# Patient Record
Sex: FEMALE | Race: WHITE | NOT HISPANIC OR LATINO | Employment: FULL TIME | URBAN - METROPOLITAN AREA
[De-identification: names, ages, dates, MRNs, and addresses within clinical notes are randomized per-mention and may not be internally consistent; named-entity substitution may affect disease eponyms.]

---

## 2018-02-02 ENCOUNTER — HISTORICAL (OUTPATIENT)
Dept: RADIOLOGY | Facility: HOSPITAL | Age: 63
End: 2018-02-02

## 2019-10-31 ENCOUNTER — HISTORICAL (OUTPATIENT)
Dept: RADIOLOGY | Facility: HOSPITAL | Age: 64
End: 2019-10-31

## 2021-08-09 LAB — CRC RECOMMENDATION EXT: NORMAL

## 2021-10-26 ENCOUNTER — HISTORICAL (OUTPATIENT)
Dept: ENDOSCOPY | Facility: HOSPITAL | Age: 66
End: 2021-10-26

## 2021-10-26 LAB
ABS NEUT (OLG): 4.55 X10(3)/MCL (ref 2.1–9.2)
BASOPHILS # BLD AUTO: 0 X10(3)/MCL (ref 0–0.2)
BASOPHILS NFR BLD AUTO: 0 %
EOSINOPHIL # BLD AUTO: 0.2 X10(3)/MCL (ref 0–0.9)
EOSINOPHIL NFR BLD AUTO: 2 %
ERYTHROCYTE [DISTWIDTH] IN BLOOD BY AUTOMATED COUNT: 17.2 % (ref 11.5–17)
HCT VFR BLD AUTO: 36.6 % (ref 37–47)
HGB BLD-MCNC: 11.1 GM/DL (ref 12–16)
LYMPHOCYTES # BLD AUTO: 1.5 X10(3)/MCL (ref 0.6–4.6)
LYMPHOCYTES NFR BLD AUTO: 22 %
MCH RBC QN AUTO: 26.8 PG (ref 27–31)
MCHC RBC AUTO-ENTMCNC: 30.3 GM/DL (ref 33–36)
MCV RBC AUTO: 88.4 FL (ref 80–94)
MONOCYTES # BLD AUTO: 0.6 X10(3)/MCL (ref 0.1–1.3)
MONOCYTES NFR BLD AUTO: 8 %
NEUTROPHILS # BLD AUTO: 4.55 X10(3)/MCL (ref 2.1–9.2)
NEUTROPHILS NFR BLD AUTO: 66 %
PLATELET # BLD AUTO: 277 X10(3)/MCL (ref 130–400)
PMV BLD AUTO: 10.7 FL (ref 9.4–12.4)
RBC # BLD AUTO: 4.14 X10(6)/MCL (ref 4.2–5.4)
WBC # SPEC AUTO: 6.9 X10(3)/MCL (ref 4.5–11.5)

## 2022-01-27 ENCOUNTER — HISTORICAL (OUTPATIENT)
Dept: ADMINISTRATIVE | Facility: HOSPITAL | Age: 67
End: 2022-01-27

## 2022-01-27 LAB
ALBUMIN SERPL-MCNC: 3.5 G/DL (ref 3.4–4.8)
ALBUMIN/GLOB SERPL: 1.3 {RATIO} (ref 1.1–2)
ALP SERPL-CCNC: 110 U/L (ref 40–150)
ALT SERPL-CCNC: 15 U/L (ref 0–55)
AST SERPL-CCNC: 14 U/L (ref 5–34)
BILIRUB SERPL-MCNC: 0.7 MG/DL
BILIRUBIN DIRECT+TOT PNL SERPL-MCNC: 0.3 (ref 0–0.5)
BILIRUBIN DIRECT+TOT PNL SERPL-MCNC: 0.4 (ref 0–0.8)
BUN SERPL-MCNC: 17.6 MG/DL (ref 9.8–20.1)
CALCIUM SERPL-MCNC: 10.5 MG/DL (ref 8.7–10.5)
CHLORIDE SERPL-SCNC: 107 MMOL/L (ref 98–107)
CO2 SERPL-SCNC: 26 MMOL/L (ref 23–31)
CREAT SERPL-MCNC: 0.56 MG/DL (ref 0.55–1.02)
ERYTHROCYTE [DISTWIDTH] IN BLOOD BY AUTOMATED COUNT: 15.4 % (ref 11.5–17)
GLOBULIN SER-MCNC: 2.7 G/DL (ref 2.4–3.5)
GLUCOSE SERPL-MCNC: 107 MG/DL (ref 82–115)
HCT VFR BLD AUTO: 35.5 % (ref 37–47)
HEMOLYSIS INTERF INDEX SERPL-ACNC: <0
HGB BLD-MCNC: 13.2 G/DL (ref 12–16)
ICTERIC INTERF INDEX SERPL-ACNC: 1
LIPEMIC INTERF INDEX SERPL-ACNC: 0
MCH RBC QN AUTO: 35.5 PG (ref 27–31)
MCHC RBC AUTO-ENTMCNC: 37.2 G/DL (ref 33–36)
MCV RBC AUTO: 95.4 FL (ref 80–94)
PLATELET # BLD AUTO: 264 10*3/UL (ref 130–400)
PMV BLD AUTO: 10.7 FL (ref 9.4–12.4)
POTASSIUM SERPL-SCNC: 4.3 MMOL/L (ref 3.5–5.1)
PROT SERPL-MCNC: 6.2 G/DL (ref 5.8–7.6)
RBC # BLD AUTO: 3.72 10*6/UL (ref 4.2–5.4)
SODIUM SERPL-SCNC: 143 MMOL/L (ref 136–145)
WBC # SPEC AUTO: 6 10*3/UL (ref 4.5–11.5)

## 2022-02-01 ENCOUNTER — HISTORICAL (OUTPATIENT)
Dept: ADMINISTRATIVE | Facility: HOSPITAL | Age: 67
End: 2022-02-01

## 2022-02-02 LAB — SARS-COV-2 RNA RESP QL NAA+PROBE: NOT DETECTED

## 2022-02-11 ENCOUNTER — HISTORICAL (OUTPATIENT)
Dept: ADMINISTRATIVE | Facility: HOSPITAL | Age: 67
End: 2022-02-11

## 2022-04-11 ENCOUNTER — HISTORICAL (OUTPATIENT)
Dept: ADMINISTRATIVE | Facility: HOSPITAL | Age: 67
End: 2022-04-11
Payer: COMMERCIAL

## 2022-04-19 ENCOUNTER — HISTORICAL (OUTPATIENT)
Dept: RADIOLOGY | Facility: HOSPITAL | Age: 67
End: 2022-04-19
Payer: COMMERCIAL

## 2022-04-19 ENCOUNTER — HISTORICAL (OUTPATIENT)
Dept: ADMINISTRATIVE | Facility: HOSPITAL | Age: 67
End: 2022-04-19
Payer: COMMERCIAL

## 2022-04-29 VITALS
BODY MASS INDEX: 39.72 KG/M2 | HEIGHT: 63 IN | SYSTOLIC BLOOD PRESSURE: 160 MMHG | OXYGEN SATURATION: 97 % | DIASTOLIC BLOOD PRESSURE: 68 MMHG | WEIGHT: 224.19 LBS

## 2022-04-30 NOTE — H&P
Patient:   Adenike Sauceda             MRN: [id number]            FIN: [id number]               Age:   66 years     Sex:  Female     :  1955   Associated Diagnoses:   None   Author:   Vishal Fine MD A      Chief Complaint   66-year-old female referred for iron deficiency anemia.  She had upper endoscopy that showed a significant pyloric stenosis unable to transverse with the standard scope.  We did not have the appropriate balloons to dilator so we recommended twice daily PPI and Carafate with plans to dilate at the hospital 8 weeks later.  She had a reassuring colonoscopy at this so we thought the anemia was related to this.  She has been maintained on iron replacement we have since not checked her CBC yet.  Plan for EGD today with dilation hopefully evaluate the stricture and consider some form of imaging for not satisfied with our exam.  She says she has less heartburn.  She quit taking the meloxicam has increased arthritic symptoms but otherwise been doing well she is tolerating her diet with no complaints      Health Status   Allergies:    Allergic Reactions (Selected)  No Known Medication Allergies,    Allergies (1) Active Reaction  No Known Medication Allergies None Documented     Current medications:  (Selected)   Inpatient Medications  Ordered  Buffered Lidocaine 1% - 1mL syringe: 0.5 mL, 5 mg =, form: Injection, ID, As Directed PRN for other (see comment), first dose 10/26/21 7:08:00 CDT, May inject 0.5mL at IV site, if not allergic  Plasmalyte 1,000 mL: 1,000 mL, 1,000 mL, IV, 50 mL/hr, start date 10/26/21 7:08:00 CDT, 1.95, m2  midazolam: 2 mg, form: Injection, IV Push, q5min, Order duration: 2 dose(s), first dose 10/26/21 7:08:00 CDT, stop date 10/26/21 7:17:00 CDT, STAT, (up to 5 mg for moderate anxiety)  Documented Medications  Documented  Cod Liver Oil: 1 cap(s), Oral, Daily, 0 Refill(s)  DULoxetine 60 mg oral delayed release capsule: 60 mg = 1 cap(s), Oral, Daily  FLUTICASONE  SPR  50MC spray(s), Nasal, Daily  LOSARTAN POT TAB 50M mg = 1 tab(s), Oral, Daily  MELOXICAM    TAB 15MG: 15 mg = 1 tab(s), Oral, Daily  Pantoprazole 40 mg ORAL EC-Tablet: 40 mg = 1 tab(s), Oral, Daily  TRANDOLAPRIL 4 MG TABLET:   VERAPAMIL    TAB 240MG ER: 240 mg = 1 tab(s), Oral, Daily  Vitamin B Complex oral capsule: 1 cap(s), Oral, Daily, # 30 cap(s), 0 Refill(s)  Vitamin D3 2000 intl units oral capsule: 2,000 IntUnit = 1 cap(s), Oral, Daily, 0 Refill(s)  sucralfate 1 g oral tablet: 1 gm = 1 tab(s), Oral, BID, # 60 tab(s), 0 Refill(s)   Problem list:    All Problems  Morbid obesity / SNOMED CT 995660756 / Probable  Multinodular goiter / SNOMED CT 683472601 / Confirmed,    Active Problems (2)  Morbid obesity   Multinodular goiter         Histories   Past Medical History:    Resolved  Pregnant (021846420): Onset on 1986 at 30 years.  Resolved on 10/22/1986 at 31 years.  Pregnant ([id number]): Onset on 1977 at 21 years.  Resolved on 1978 at 22 years.  Pregnant ([id number]): Onset on 1973 at 17 years.  Resolved on 1973 at 18 years.  Hypertension (51446962):  Resolved.  Hepatitis C (92647848):  Resolved.  Thyroid nodule (407195896):  Resolved.   Family History:    Metastatic cancer  Brother  Congestive heart disease.  Father     Procedure history:    Nephrolithotomy for removal of calculus ([id number]) on 1981 at 25 Years.   Social History        Social & Psychosocial Habits    Alcohol  2018  Use: Current    Type: Beer, Liquor, Wine    Frequency: 1-2 times per month    2019  Use: Current    Type: Beer    Frequency: 1-2 times per week    Has alcohol use interfered with work or home life? No    Has anyone been hurt or at risk by your drinking? No    Concerns about alcohol use in household: No    Employment/School  2018  Status: Employed    Home/Environment  2018  Lives with: Spouse    Living situation: Home/Independent    Nutrition/Health  2018  Type of diet:  Regular    Substance Use  08/08/2018  Use: Never    11/13/2019  Use: Never    Tobacco  08/08/2018  Use: Never smoker    11/01/2018  Use: Never smoker    Type: Cigarettes    Patient Wants Consult For Cessation Counseling N/A    11/13/2019  Use: Never (less than 100 in l    Patient Wants Consult For Cessation Counseling N/A    10/26/2021  Use: Never (less than 100 in l    Patient Wants Consult For Cessation Counseling N/A    Abuse/Neglect  11/13/2019  SHX Any signs of abuse or neglect No    Feels unsafe at home: No    Safe place to go: Yes    10/26/2021  SHX Any signs of abuse or neglect No  .        Physical Examination      Vital Signs (last 24 hrs)_____  Last Charted___________  Resp Rate         12 br/min  (OCT 26 07:19)  SBP      H 152mmHg  (OCT 26 07:19)  DBP      73 mmHg  (OCT 26 07:19)  SpO2      98 %  (OCT 26 07:19)  Weight      97.52 kg  (OCT 26 07:16)  Height      162.56 cm  (OCT 26 07:16)  BMI      36.9  (OCT 26 07:16)     Measurements from flowsheet : Measurements   10/26/2021 7:16 CDT      Weight Dosing             97.52 kg                             Weight Measured           97.52 kg                             Weight Measured and Calculated in Lbs     214.99 lb                             Height/Length Dosing      162.56 cm                             Height/Length Measured    162.56 cm                             Body Mass Index Measured  36.9 kg/m2        Health Maintenance      Health Maintenance     Pending (in the next year)        OverDue           Bone Density Screening due  08/08/20  and every 2  year(s)           Depression Screening due  11/12/20  and every 1  year(s)           Alcohol Misuse Screening due  01/02/21  and every 1  year(s)           Cognitive Screening due  01/02/21  and every 1  year(s)           Fall Risk Assessment due  01/02/21  and every 1  year(s)           Blood Pressure Screening due  07/20/21  and every 1  year(s)           Body Mass Index Check due  07/20/21  and  every 1  year(s)        Due            ADL Screening due  10/26/21  and every 1  year(s)           Aspirin Therapy for CVD Prevention due  10/26/21  and every 1  year(s)           Breast Cancer Screening due  10/26/21  Unknown Frequency           Colorectal Screening due  10/26/21  Unknown Frequency           Diabetes Screening due  10/26/21  Unknown Frequency           Lipid Screening due  10/26/21  Unknown Frequency           Medicare Annual Wellness Exam due  10/26/21  and every 1  year(s)           Pneumococcal Vaccine due  10/26/21  Unknown Frequency           Tetanus Vaccine due  10/26/21  and every 10  year(s)           Zoster Vaccine due  10/26/21  Unknown Frequency        Due In Future            Obesity Screening not due until  01/01/22  and every 1  year(s)           Advance Directive not due until  01/02/22  and every 1  year(s)           Functional Assessment not due until  01/02/22  and every 1  year(s)     Satisfied (in the past 1 year)        Satisfied            Advance Directive on  10/26/21.  Satisfied by Lashawn Lester           Blood Pressure Screening on  10/26/21.  Satisfied by Lashawn Lester           Body Mass Index Check on  10/26/21.  Satisfied by Lashawn Lester           Functional Assessment on  10/26/21.  Satisfied by Lashawn Lester           Influenza Vaccine on  10/26/21.  Satisfied by Lashawn Lester           Obesity Screening on  10/26/21.  Satisfied by Lashawn Lester          Review / Management   Results review:     No qualifying data available.

## 2022-05-12 DIAGNOSIS — E04.2 MULTIPLE THYROID NODULES: Primary | ICD-10-CM

## 2022-10-18 ENCOUNTER — DOCUMENTATION ONLY (OUTPATIENT)
Dept: SURGICAL ONCOLOGY | Facility: CLINIC | Age: 67
End: 2022-10-18
Payer: MEDICARE

## 2022-10-18 NOTE — PROGRESS NOTES
1-12-22  DR. YUDY HDEZ OFFICE NOTE      EVAL FOR GALLSTONES AND PYLORIC STENOSIS    History of Present Illness         66 year-old-female referred by Dr. Vishal Fine who saw patient in consultation for anemia.  EGD was done showing a significant pyloric stenosis unable to transverse with scope and unable to dilate due to not having appropriate balloon.  Follow up EGD was performed in Oct 2021 showing pyloric stenosis secondary to PUD, dilation performed with biopsy.  Pathology showed acute gastritis with ulceration and fibrinopurulent exudate.  CT abdomen showed a large intraluminal gallstone measuring 4.2cm in the gallbladder neck.  EGD again performed in Dec 2021 showing persistent chronic ulceration with pyloric stenosis, still fairly tight, dilation performed.  Biopsies have not proven malignancy.  CT scan shows no obvious evidence of malignancy.  The patient does report significant 30 pound unintentional weight loss.    Review of Systems         14 point review of systems was performed and was negative except for those pertinent positives and negatives mentioned in the history of present illness    Physical Exam             General: Alert and oriented, No acute distress.  Eye: Pupils are equal, round and reactive to light, Extraocular movements are intact, Normal conjunctiva, Vision unchanged.  HENT: Normal hearing, Oral mucosa is moist, No pharyngeal erythema, Ear canals patent, No sinus tenderness.  Neck: Supple, Non-tender, No carotid bruit, No jugular venous distention, No lymphadenopathy, No thyromegaly.  Respiratory: Lungs are clear to auscultation, Respirations are non-labored, Breath sounds are equal, Symmetrical chest wall expansion, No chest wall tenderness.  Cardiovascular: Normal rate, Regular rhythm, No murmur, No gallop, Good pulses equal in all extremities, Normal peripheral perfusion, No edema.  Genitourinary: No costovertebral angle tenderness, No inguinal tenderness, No urethral  discharge, No lesions.  Lymphatics: No lymphadenopathy neck, axilla, groin.  Musculoskeletal: Normal range of motion, Normal strength, No tenderness, No swelling, No deformity, Normal gait.  Integumentary: Warm, Pink, Intact, Moist, No pallor, No rash.  Cognition and Speech: Oriented, Speech clear and coherent, Functional cognition intact.   Abdomen: Soft nontender, nondistended, no palpable masses    Assessment/Plan           1. Cholelithiasis K80.20       Ordered:           2. Pyloric stenosis in adult K31.1       Chronic pyloric/gastric ulcer with significant stenosis, cannot rule out malignancy.  Clinical picture is not consistent with gastrinoma.   Recommend laparoscopic/robotic antrectomy and vagotomy with Billroth II reconstruction, cholecystectomy  The risks and benefits of this procedure were explained in detail, all questions were answered, the patient voiced understanding, and gives their consent to proceed.          Ordered:      Office/Outpatient Visit Level 5 New [id number] PC, Cholelithiasis  Pyloric stenosis in adult, [facility], 01/12/22 14:42:00 CST              Problem List/Past Medical History     Ongoing   Morbid obesity    Multinodular goiter    Historical   Hepatitis C    Hypertension    Pregnant      Procedure/Surgical History   Balloon Dilation Gastrointestional (10/26/2021)  Biopsy Gastrointestinal (10/26/2021)  Dilation of Esophagus, Via Natural or Artificial Opening Endoscopic (10/26/2021)  Esophagogastroduodenoscopy (10/26/2021)  Esophagogastroduodenoscopy, flexible, transoral; with biopsy, single or multiple (10/26/2021)  Esophagogastroduodenoscopy, flexible, transoral; with transendoscopic balloon dilation of esophagus (less than 30 mm diameter) (10/26/2021)  Excision of Stomach, Pylorus, Via Natural or Artificial Opening Endoscopic (10/26/2021)  Nephrolithotomy for removal of calculus (01/01/1981)    Medications     Cod Liver Oil, 1 cap(s), Oral, Daily    DULoxetine 60 mg  oral delayed release capsule, 60 mg= 1 cap(s), Oral, Daily    FLUTICASONE SPR 50MCG, 2 spray(s), Nasal, Daily    LOSARTAN POT TAB 50MG, 50 mg= 1 tab(s), Oral, Daily    MELOXICAM TAB 15MG, 15 mg= 1 tab(s), Oral, Daily    Pantoprazole 40 mg ORAL EC-Tablet, 40 mg= 1 tab(s), Oral, Daily    sucralfate 1 g oral tablet, 1 gm= 1 tab(s), Oral, BID    TRANDOLAPRIL 4 MG TABLET    VERAPAMIL TAB 240MG ER, 240 mg= 1 tab(s), Oral, Daily    Vitamin B Complex oral capsule, 1 cap(s), Oral, Daily    Vitamin D3 2000 intl units oral capsule, 2000 IntUnit= 1 cap(s), Oral, Daily    Allergies     No Known Medication Allergies    Social History       Abuse/Neglect      No, 10/26/2021      No, No, Yes, 11/13/2019      Alcohol      Current, Beer, 1-2 times per week, Alcohol use interferes with work or home: No. Others hurt by drinking: No. Household alcohol concerns: No., 11/13/2019      Current, Beer, Wine, Liquor, 1-2 times per month, 08/08/2018      Employment/School      Employed, 08/08/2018      Home/Environment      Lives with Spouse. Living situation: Home/Independent., 08/08/2018      Nutrition/Health      Regular, 08/08/2018      Substance Use      Never, 11/13/2019      Never, 08/08/2018      Tobacco      Never (less than 100 in lifetime), N/A, 10/26/2021      Never (less than 100 in lifetime), N/A, 11/13/2019      Never smoker, Cigarettes, N/A, 11/01/2018      Never smoker Use:., 08/08/2018    Family History     Congestive heart disease.: Father.    Metastatic cancer: Brother.                         Result type:  Surgery Office/Clinic Note     Result date:  January 12, 2022 14:30 CST     Result status:  Auth (Verified)     Result title:  Office Visit Note     Performed by:  Funmilayo Gao on January 10, 2022 12:32 CST     Verified by:  Thanh Nassar MD on January 12, 2022 14:44 CST     Encounter info:  [id number], [facility], Clinic Visit, 1/12/2022 - 1/12/2022

## 2022-10-18 NOTE — PROGRESS NOTES
3-8-22   ANGIE PECK NP POST OP NOTE      Subjective POST OP LAP/RADHA ANTRECTOMY WITH B2, TREMAINE    HX PYLORIC STENOSIS SECONDARY TO PUD   Review of Systems PT REPORTS DOING WELL  SHE IS EATING SOFT FOODS AND TOLERATING WELL  BOWELS WORKING  NO NAUSEA OR VOMITING  NO FEVER OR CHILLS  NO COMPLAINTS OF PAIN Objective   Physical Exam General: Alert and oriented, No acute distress.    Integumentary: warm, dry, no rash or jaundice    Abd: soft, nontender    Incisions: healing well; no signs of infection  Assessment/Plan PATH: CHRONIC INFLAMMATION, BENIGN, GB GALLSTONES  PATH DISCUSSED AND QUESTIONS ANSWERED  PT PLEASED  WILL CALL WITH ANY PROBLEMS    RTC PRN   Result type: Postoperative Note   Result date: March 08, 2022 10:20 CST   Result status: Auth (Verified)   Result title: POST OP   Performed by: Angie Keith on March 08, 2022 10:20 CST   Verified by: Angie Keith on March 08, 2022 10:20 CST   Encounter info: [id number], [facility], Clinic Visit, 3/8/2022 - 3/8/2022

## 2022-10-25 ENCOUNTER — OFFICE VISIT (OUTPATIENT)
Dept: SURGICAL ONCOLOGY | Facility: CLINIC | Age: 67
End: 2022-10-25
Payer: MEDICARE

## 2022-10-25 VITALS
SYSTOLIC BLOOD PRESSURE: 159 MMHG | HEART RATE: 86 BPM | HEIGHT: 63 IN | WEIGHT: 215.19 LBS | BODY MASS INDEX: 38.13 KG/M2 | DIASTOLIC BLOOD PRESSURE: 82 MMHG

## 2022-10-25 DIAGNOSIS — K43.2 INCISIONAL HERNIA, WITHOUT OBSTRUCTION OR GANGRENE: Primary | ICD-10-CM

## 2022-10-25 PROCEDURE — 99213 OFFICE O/P EST LOW 20 MIN: CPT | Mod: S$PBB,,, | Performed by: SURGERY

## 2022-10-25 PROCEDURE — 99213 OFFICE O/P EST LOW 20 MIN: CPT | Mod: PBBFAC | Performed by: SURGERY

## 2022-10-25 PROCEDURE — 99213 PR OFFICE/OUTPT VISIT, EST, LEVL III, 20-29 MIN: ICD-10-PCS | Mod: S$PBB,,, | Performed by: SURGERY

## 2022-10-25 PROCEDURE — 99999 PR PBB SHADOW E&M-EST. PATIENT-LVL III: CPT | Mod: PBBFAC,,, | Performed by: SURGERY

## 2022-10-25 PROCEDURE — 99999 PR PBB SHADOW E&M-EST. PATIENT-LVL III: ICD-10-PCS | Mod: PBBFAC,,, | Performed by: SURGERY

## 2022-10-25 RX ORDER — IRBESARTAN 150 MG/1
150 TABLET ORAL DAILY
COMMUNITY
Start: 2022-01-12

## 2022-10-25 RX ORDER — AMLODIPINE BESYLATE 2.5 MG/1
2.5 TABLET ORAL DAILY
COMMUNITY
Start: 2022-01-12

## 2022-10-25 RX ORDER — FAMOTIDINE 20 MG/1
20 TABLET, FILM COATED ORAL DAILY PRN
COMMUNITY
Start: 2022-01-12

## 2022-10-25 RX ORDER — MELOXICAM 7.5 MG/1
7.5 TABLET ORAL DAILY
COMMUNITY

## 2022-10-25 NOTE — PROGRESS NOTES
Chief complaint:  Incisional hernia     HPI:  67-year-old female known to us for previous laparoscopic/robotic antrectomy with Billroth II reconstruction for benign intrinsic acquired pyloric stenosis secondary to peptic ulcer disease.  Her results from surgery were very satisfactory, now tolerating regular diet without difficulty no dumping syndrome, early satiety the significant gastrointestinal symptoms.  She does complain of a bulge at her left upper quadrant extraction site which has increased in size.  No obstructive symptoms    Greater than 30 minutes was required for complete chart review, patient consultation, medical decision-making, surgical scheduling/coordination and documentation          Past Medical and Surgical History  Allergies :   Patient has no known allergies.    @[facility]@  Medical :   She has a past medical history of History of hepatitis C and Hypertension.    Surgical :   She has a past surgical history that includes Pyloric antrectomy; L/R Truncal Vagotomy; Cholecystectomy; and Removal of Kidney Stone.     Family History  Her family history is not on file.    Social History  She      Review of Systems   Constitutional:  Negative for appetite change, chills, diaphoresis and fever.   HENT:  Negative for congestion, drooling, ear discharge, ear pain and hearing loss.    Eyes:  Negative for discharge.   Respiratory:  Negative for apnea, cough, choking, chest tightness, shortness of breath and stridor.    Cardiovascular:  Negative for chest pain, palpitations and leg swelling.   Endocrine: Negative for cold intolerance and heat intolerance.   Genitourinary:  Negative for difficulty urinating, dyspareunia, dysuria and hematuria.   Musculoskeletal:  Negative for arthralgias, gait problem and joint swelling.   Skin:  Negative for color change and rash.   Neurological:  Negative for dizziness, tremors, seizures, syncope, facial asymmetry, speech difficulty, light-headedness, numbness and headaches.    Psychiatric/Behavioral:  Negative for agitation and confusion.       Objective   Physical Exam  Vitals and nursing note reviewed.   Constitutional:       General: She is not in acute distress.     Appearance: Normal appearance. She is not ill-appearing, toxic-appearing or diaphoretic.   HENT:      Head: Normocephalic and atraumatic.      Right Ear: External ear normal.      Left Ear: External ear normal.      Nose: Nose normal.      Mouth/Throat:      Mouth: Mucous membranes are moist.      Pharynx: Oropharynx is clear.   Eyes:      General: No scleral icterus.     Extraocular Movements: Extraocular movements intact.      Conjunctiva/sclera: Conjunctivae normal.      Pupils: Pupils are equal, round, and reactive to light.   Cardiovascular:      Rate and Rhythm: Normal rate and regular rhythm.      Pulses: Normal pulses.      Heart sounds: No murmur heard.    No friction rub. No gallop.   Pulmonary:      Effort: Pulmonary effort is normal. No respiratory distress.      Breath sounds: Normal breath sounds. No stridor.   Abdominal:      General: Abdomen is flat. There is no distension.      Palpations: Abdomen is soft. There is no mass.      Tenderness: There is no abdominal tenderness. There is no right CVA tenderness, left CVA tenderness, guarding or rebound.      Hernia: A hernia is present.      Comments: Incarcerated left upper quadrant incisional hernia   Musculoskeletal:         General: No swelling, tenderness, deformity or signs of injury.      Cervical back: Normal range of motion and neck supple. No rigidity or tenderness.      Right lower leg: No edema.      Left lower leg: No edema.   Lymphadenopathy:      Cervical: No cervical adenopathy.   Skin:     General: Skin is warm.      Capillary Refill: Capillary refill takes less than 2 seconds.      Coloration: Skin is not jaundiced or pale.      Findings: No bruising, erythema, lesion or rash.   Neurological:      General: No focal deficit present.       "Mental Status: She is alert and oriented to person, place, and time. Mental status is at baseline.      Cranial Nerves: No cranial nerve deficit.      Sensory: No sensory deficit.      Motor: No weakness.      Coordination: Coordination normal.      Gait: Gait normal.   Psychiatric:         Mood and Affect: Mood normal.         Behavior: Behavior normal.         Thought Content: Thought content normal.         Judgment: Judgment normal.     VITAL SIGNS: 24 HR MIN & MAX LAST    @FLOWSTAT(6:24::1)@           @FLOWSTAT(5:24::1)@  (!) 159/82     @FLOWSTAT(8:24::1)@  86     @FLOWSTAT(9:24::1)@       @FLOWSTAT(10:24::1)@         HT: 5' 3" (160 cm)  WT: 97.6 kg (215 lb 3.2 oz)  BMI: 38.1       Assessment & Plan     Incarcerated left upper quadrant incisional hernia, increasing in size     Laparoscopic/robotic repair of incarcerated incisional hernia with mesh  The risks and benefits of the procedure were explained in detail, questions were addressed, the patient gives consent to proceed    Thanh Nassar MD  Surgical Oncology  Complex General, Gastrointestinal and Hepatobiliary Surgery      "

## 2022-10-25 NOTE — H&P (VIEW-ONLY)
Chief complaint:  Incisional hernia     HPI:  67-year-old female known to us for previous laparoscopic/robotic antrectomy with Billroth II reconstruction for benign intrinsic acquired pyloric stenosis secondary to peptic ulcer disease.  Her results from surgery were very satisfactory, now tolerating regular diet without difficulty no dumping syndrome, early satiety the significant gastrointestinal symptoms.  She does complain of a bulge at her left upper quadrant extraction site which has increased in size.  No obstructive symptoms    Greater than 30 minutes was required for complete chart review, patient consultation, medical decision-making, surgical scheduling/coordination and documentation          Past Medical and Surgical History  Allergies :   Patient has no known allergies.    @[facility]@  Medical :   She has a past medical history of History of hepatitis C and Hypertension.    Surgical :   She has a past surgical history that includes Pyloric antrectomy; L/R Truncal Vagotomy; Cholecystectomy; and Removal of Kidney Stone.     Family History  Her family history is not on file.    Social History  She      Review of Systems   Constitutional:  Negative for appetite change, chills, diaphoresis and fever.   HENT:  Negative for congestion, drooling, ear discharge, ear pain and hearing loss.    Eyes:  Negative for discharge.   Respiratory:  Negative for apnea, cough, choking, chest tightness, shortness of breath and stridor.    Cardiovascular:  Negative for chest pain, palpitations and leg swelling.   Endocrine: Negative for cold intolerance and heat intolerance.   Genitourinary:  Negative for difficulty urinating, dyspareunia, dysuria and hematuria.   Musculoskeletal:  Negative for arthralgias, gait problem and joint swelling.   Skin:  Negative for color change and rash.   Neurological:  Negative for dizziness, tremors, seizures, syncope, facial asymmetry, speech difficulty, light-headedness, numbness and headaches.    Psychiatric/Behavioral:  Negative for agitation and confusion.       Objective   Physical Exam  Vitals and nursing note reviewed.   Constitutional:       General: She is not in acute distress.     Appearance: Normal appearance. She is not ill-appearing, toxic-appearing or diaphoretic.   HENT:      Head: Normocephalic and atraumatic.      Right Ear: External ear normal.      Left Ear: External ear normal.      Nose: Nose normal.      Mouth/Throat:      Mouth: Mucous membranes are moist.      Pharynx: Oropharynx is clear.   Eyes:      General: No scleral icterus.     Extraocular Movements: Extraocular movements intact.      Conjunctiva/sclera: Conjunctivae normal.      Pupils: Pupils are equal, round, and reactive to light.   Cardiovascular:      Rate and Rhythm: Normal rate and regular rhythm.      Pulses: Normal pulses.      Heart sounds: No murmur heard.    No friction rub. No gallop.   Pulmonary:      Effort: Pulmonary effort is normal. No respiratory distress.      Breath sounds: Normal breath sounds. No stridor.   Abdominal:      General: Abdomen is flat. There is no distension.      Palpations: Abdomen is soft. There is no mass.      Tenderness: There is no abdominal tenderness. There is no right CVA tenderness, left CVA tenderness, guarding or rebound.      Hernia: A hernia is present.      Comments: Incarcerated left upper quadrant incisional hernia   Musculoskeletal:         General: No swelling, tenderness, deformity or signs of injury.      Cervical back: Normal range of motion and neck supple. No rigidity or tenderness.      Right lower leg: No edema.      Left lower leg: No edema.   Lymphadenopathy:      Cervical: No cervical adenopathy.   Skin:     General: Skin is warm.      Capillary Refill: Capillary refill takes less than 2 seconds.      Coloration: Skin is not jaundiced or pale.      Findings: No bruising, erythema, lesion or rash.   Neurological:      General: No focal deficit present.       "Mental Status: She is alert and oriented to person, place, and time. Mental status is at baseline.      Cranial Nerves: No cranial nerve deficit.      Sensory: No sensory deficit.      Motor: No weakness.      Coordination: Coordination normal.      Gait: Gait normal.   Psychiatric:         Mood and Affect: Mood normal.         Behavior: Behavior normal.         Thought Content: Thought content normal.         Judgment: Judgment normal.     VITAL SIGNS: 24 HR MIN & MAX LAST    @FLOWSTAT(6:24::1)@           @FLOWSTAT(5:24::1)@  (!) 159/82     @FLOWSTAT(8:24::1)@  86     @FLOWSTAT(9:24::1)@       @FLOWSTAT(10:24::1)@         HT: 5' 3" (160 cm)  WT: 97.6 kg (215 lb 3.2 oz)  BMI: 38.1       Assessment & Plan     Incarcerated left upper quadrant incisional hernia, increasing in size     Laparoscopic/robotic repair of incarcerated incisional hernia with mesh  The risks and benefits of the procedure were explained in detail, questions were addressed, the patient gives consent to proceed    Thanh Nassar MD  Surgical Oncology  Complex General, Gastrointestinal and Hepatobiliary Surgery      "

## 2022-10-27 DIAGNOSIS — K43.2 INCISIONAL HERNIA, WITHOUT OBSTRUCTION OR GANGRENE: Primary | ICD-10-CM

## 2022-10-27 DIAGNOSIS — K43.2 INCISIONAL HERNIA: ICD-10-CM

## 2022-10-27 RX ORDER — ENOXAPARIN SODIUM 100 MG/ML
30 INJECTION SUBCUTANEOUS EVERY 24 HOURS
Status: CANCELLED | OUTPATIENT
Start: 2022-10-27

## 2022-11-14 RX ORDER — VITAMIN B COMPLEX
1 CAPSULE ORAL DAILY
COMMUNITY

## 2022-11-14 RX ORDER — LANOLIN ALCOHOL/MO/W.PET/CERES
1 CREAM (GRAM) TOPICAL
COMMUNITY

## 2022-11-14 RX ORDER — TRANDOLAPRIL 4 MG/1
4 TABLET ORAL DAILY
COMMUNITY

## 2022-11-14 RX ORDER — IBUPROFEN 100 MG/5ML
1000 SUSPENSION, ORAL (FINAL DOSE FORM) ORAL DAILY
COMMUNITY

## 2022-11-14 RX ORDER — VERAPAMIL HYDROCHLORIDE 120 MG/1
240 TABLET, FILM COATED ORAL DAILY
COMMUNITY

## 2022-11-14 RX ORDER — DULOXETIN HYDROCHLORIDE 60 MG/1
60 CAPSULE, DELAYED RELEASE ORAL DAILY
COMMUNITY

## 2022-11-14 RX ORDER — PANTOPRAZOLE SODIUM 40 MG/1
40 TABLET, DELAYED RELEASE ORAL 2 TIMES DAILY
COMMUNITY

## 2022-11-16 ENCOUNTER — HOSPITAL ENCOUNTER (OUTPATIENT)
Dept: RADIOLOGY | Facility: HOSPITAL | Age: 67
Discharge: HOME | End: 2022-11-16
Attending: SURGERY
Payer: MEDICARE

## 2022-11-16 ENCOUNTER — ANESTHESIA EVENT (OUTPATIENT)
Dept: SURGERY | Facility: HOSPITAL | Age: 67
End: 2022-11-16
Payer: MEDICARE

## 2022-11-16 DIAGNOSIS — K43.2 INCISIONAL HERNIA, WITHOUT OBSTRUCTION OR GANGRENE: ICD-10-CM

## 2022-11-16 PROCEDURE — 71045 X-RAY EXAM CHEST 1 VIEW: CPT | Mod: TC

## 2022-11-21 ENCOUNTER — ANESTHESIA (OUTPATIENT)
Dept: SURGERY | Facility: HOSPITAL | Age: 67
End: 2022-11-21
Payer: MEDICARE

## 2022-11-21 ENCOUNTER — HOSPITAL ENCOUNTER (OUTPATIENT)
Facility: HOSPITAL | Age: 67
Discharge: HOME | End: 2022-11-21
Attending: SURGERY | Admitting: SURGERY
Payer: MEDICARE

## 2022-11-21 DIAGNOSIS — K43.2 INCISIONAL HERNIA: ICD-10-CM

## 2022-11-21 DIAGNOSIS — K43.2 INCISIONAL HERNIA, WITHOUT OBSTRUCTION OR GANGRENE: ICD-10-CM

## 2022-11-21 PROCEDURE — 36000711: Performed by: SURGERY

## 2022-11-21 PROCEDURE — 25000003 PHARM REV CODE 250: Performed by: NURSE ANESTHETIST, CERTIFIED REGISTERED

## 2022-11-21 PROCEDURE — 27201423 OPTIME MED/SURG SUP & DEVICES STERILE SUPPLY: Performed by: SURGERY

## 2022-11-21 PROCEDURE — 63600175 PHARM REV CODE 636 W HCPCS

## 2022-11-21 PROCEDURE — 63600175 PHARM REV CODE 636 W HCPCS: Performed by: SURGERY

## 2022-11-21 PROCEDURE — 63600175 PHARM REV CODE 636 W HCPCS: Performed by: ANESTHESIOLOGY

## 2022-11-21 PROCEDURE — C1781 MESH (IMPLANTABLE): HCPCS | Performed by: SURGERY

## 2022-11-21 PROCEDURE — 37000008 HC ANESTHESIA 1ST 15 MINUTES: Performed by: SURGERY

## 2022-11-21 PROCEDURE — 25000003 PHARM REV CODE 250: Performed by: ANESTHESIOLOGY

## 2022-11-21 PROCEDURE — 49654 PR LAP, INCISIONAL HERNIA REPAIR,REDUCIBLE: CPT | Mod: ,,, | Performed by: SURGERY

## 2022-11-21 PROCEDURE — 88302 TISSUE EXAM BY PATHOLOGIST: CPT | Performed by: SURGERY

## 2022-11-21 PROCEDURE — 71000016 HC POSTOP RECOV ADDL HR: Performed by: SURGERY

## 2022-11-21 PROCEDURE — 71000033 HC RECOVERY, INTIAL HOUR: Performed by: SURGERY

## 2022-11-21 PROCEDURE — 36000710: Performed by: SURGERY

## 2022-11-21 PROCEDURE — 37000009 HC ANESTHESIA EA ADD 15 MINS: Performed by: SURGERY

## 2022-11-21 PROCEDURE — 63600175 PHARM REV CODE 636 W HCPCS: Performed by: NURSE ANESTHETIST, CERTIFIED REGISTERED

## 2022-11-21 PROCEDURE — 49654 PR LAP, INCISIONAL HERNIA REPAIR,REDUCIBLE: ICD-10-PCS | Mod: ,,, | Performed by: SURGERY

## 2022-11-21 PROCEDURE — 71000015 HC POSTOP RECOV 1ST HR: Performed by: SURGERY

## 2022-11-21 PROCEDURE — 25000003 PHARM REV CODE 250: Performed by: SURGERY

## 2022-11-21 DEVICE — MESH HERN SYNECOR 12CM CIRCLE: Type: IMPLANTABLE DEVICE | Site: ABDOMEN | Status: FUNCTIONAL

## 2022-11-21 RX ORDER — SODIUM CHLORIDE, SODIUM GLUCONATE, SODIUM ACETATE, POTASSIUM CHLORIDE AND MAGNESIUM CHLORIDE 30; 37; 368; 526; 502 MG/100ML; MG/100ML; MG/100ML; MG/100ML; MG/100ML
INJECTION, SOLUTION INTRAVENOUS CONTINUOUS
Status: DISCONTINUED | OUTPATIENT
Start: 2022-11-21 | End: 2022-11-21 | Stop reason: HOSPADM

## 2022-11-21 RX ORDER — ONDANSETRON 2 MG/ML
4 INJECTION INTRAMUSCULAR; INTRAVENOUS DAILY PRN
Status: DISCONTINUED | OUTPATIENT
Start: 2022-11-21 | End: 2022-11-21 | Stop reason: HOSPADM

## 2022-11-21 RX ORDER — LIDOCAINE HYDROCHLORIDE 10 MG/ML
1 INJECTION, SOLUTION EPIDURAL; INFILTRATION; INTRACAUDAL; PERINEURAL ONCE
Status: DISCONTINUED | OUTPATIENT
Start: 2022-11-21 | End: 2022-11-21 | Stop reason: HOSPADM

## 2022-11-21 RX ORDER — ENOXAPARIN SODIUM 100 MG/ML
30 INJECTION SUBCUTANEOUS EVERY 24 HOURS
Status: DISCONTINUED | OUTPATIENT
Start: 2022-11-21 | End: 2022-11-21 | Stop reason: HOSPADM

## 2022-11-21 RX ORDER — MIDAZOLAM HYDROCHLORIDE 1 MG/ML
INJECTION INTRAMUSCULAR; INTRAVENOUS
Status: DISCONTINUED | OUTPATIENT
Start: 2022-11-21 | End: 2022-11-21

## 2022-11-21 RX ORDER — ACETAMINOPHEN 500 MG
1000 TABLET ORAL
Status: COMPLETED | OUTPATIENT
Start: 2022-11-21 | End: 2022-11-21

## 2022-11-21 RX ORDER — FENTANYL CITRATE 50 UG/ML
INJECTION, SOLUTION INTRAMUSCULAR; INTRAVENOUS
Status: DISCONTINUED | OUTPATIENT
Start: 2022-11-21 | End: 2022-11-21

## 2022-11-21 RX ORDER — DIPHENHYDRAMINE HYDROCHLORIDE 50 MG/ML
25 INJECTION INTRAMUSCULAR; INTRAVENOUS EVERY 6 HOURS PRN
Status: DISCONTINUED | OUTPATIENT
Start: 2022-11-21 | End: 2022-11-21 | Stop reason: HOSPADM

## 2022-11-21 RX ORDER — BUPIVACAINE HYDROCHLORIDE 5 MG/ML
INJECTION, SOLUTION EPIDURAL; INTRACAUDAL
Status: DISCONTINUED | OUTPATIENT
Start: 2022-11-21 | End: 2022-11-21 | Stop reason: HOSPADM

## 2022-11-21 RX ORDER — KETOROLAC TROMETHAMINE 30 MG/ML
15 INJECTION, SOLUTION INTRAMUSCULAR; INTRAVENOUS ONCE
Status: COMPLETED | OUTPATIENT
Start: 2022-11-21 | End: 2022-11-21

## 2022-11-21 RX ORDER — MIDAZOLAM HYDROCHLORIDE 1 MG/ML
2 INJECTION INTRAMUSCULAR; INTRAVENOUS ONCE AS NEEDED
Status: DISCONTINUED | OUTPATIENT
Start: 2022-11-21 | End: 2022-11-21 | Stop reason: HOSPADM

## 2022-11-21 RX ORDER — DEXAMETHASONE SODIUM PHOSPHATE 4 MG/ML
INJECTION, SOLUTION INTRA-ARTICULAR; INTRALESIONAL; INTRAMUSCULAR; INTRAVENOUS; SOFT TISSUE
Status: DISCONTINUED | OUTPATIENT
Start: 2022-11-21 | End: 2022-11-21

## 2022-11-21 RX ORDER — METOCLOPRAMIDE HYDROCHLORIDE 5 MG/ML
10 INJECTION INTRAMUSCULAR; INTRAVENOUS EVERY 10 MIN PRN
Status: DISCONTINUED | OUTPATIENT
Start: 2022-11-21 | End: 2022-11-21 | Stop reason: HOSPADM

## 2022-11-21 RX ORDER — PROPOFOL 10 MG/ML
VIAL (ML) INTRAVENOUS
Status: DISCONTINUED | OUTPATIENT
Start: 2022-11-21 | End: 2022-11-21

## 2022-11-21 RX ORDER — ROCURONIUM BROMIDE 10 MG/ML
INJECTION, SOLUTION INTRAVENOUS
Status: DISCONTINUED | OUTPATIENT
Start: 2022-11-21 | End: 2022-11-21

## 2022-11-21 RX ORDER — PHENYLEPHRINE HYDROCHLORIDE 10 MG/ML
INJECTION INTRAVENOUS
Status: DISCONTINUED | OUTPATIENT
Start: 2022-11-21 | End: 2022-11-21

## 2022-11-21 RX ORDER — LIDOCAINE HYDROCHLORIDE 20 MG/ML
INJECTION, SOLUTION EPIDURAL; INFILTRATION; INTRACAUDAL; PERINEURAL
Status: DISCONTINUED | OUTPATIENT
Start: 2022-11-21 | End: 2022-11-21

## 2022-11-21 RX ORDER — KETOROLAC TROMETHAMINE 30 MG/ML
INJECTION, SOLUTION INTRAMUSCULAR; INTRAVENOUS
Status: COMPLETED
Start: 2022-11-21 | End: 2022-11-21

## 2022-11-21 RX ORDER — CEFAZOLIN SODIUM 2 G/50ML
2 SOLUTION INTRAVENOUS
Status: COMPLETED | OUTPATIENT
Start: 2022-11-21 | End: 2022-11-21

## 2022-11-21 RX ORDER — ONDANSETRON 4 MG/1
4 TABLET, ORALLY DISINTEGRATING ORAL ONCE
Status: COMPLETED | OUTPATIENT
Start: 2022-11-21 | End: 2022-11-21

## 2022-11-21 RX ORDER — ONDANSETRON 2 MG/ML
INJECTION INTRAMUSCULAR; INTRAVENOUS
Status: DISCONTINUED | OUTPATIENT
Start: 2022-11-21 | End: 2022-11-21

## 2022-11-21 RX ORDER — HYDROCODONE BITARTRATE AND ACETAMINOPHEN 7.5; 325 MG/1; MG/1
1 TABLET ORAL EVERY 6 HOURS PRN
Qty: 10 TABLET | Refills: 0 | Status: SHIPPED | OUTPATIENT
Start: 2022-11-21 | End: 2023-04-05

## 2022-11-21 RX ORDER — GABAPENTIN 300 MG/1
300 CAPSULE ORAL
Status: COMPLETED | OUTPATIENT
Start: 2022-11-21 | End: 2022-11-21

## 2022-11-21 RX ORDER — ESMOLOL HYDROCHLORIDE 10 MG/ML
INJECTION INTRAVENOUS
Status: DISCONTINUED | OUTPATIENT
Start: 2022-11-21 | End: 2022-11-21

## 2022-11-21 RX ORDER — HYDROMORPHONE HYDROCHLORIDE 2 MG/ML
0.5 INJECTION, SOLUTION INTRAMUSCULAR; INTRAVENOUS; SUBCUTANEOUS EVERY 5 MIN PRN
Status: DISCONTINUED | OUTPATIENT
Start: 2022-11-21 | End: 2022-11-21 | Stop reason: HOSPADM

## 2022-11-21 RX ADMIN — DEXAMETHASONE SODIUM PHOSPHATE 4 MG: 4 INJECTION, SOLUTION INTRA-ARTICULAR; INTRALESIONAL; INTRAMUSCULAR; INTRAVENOUS; SOFT TISSUE at 10:11

## 2022-11-21 RX ADMIN — FENTANYL CITRATE 50 MCG: 50 INJECTION, SOLUTION INTRAMUSCULAR; INTRAVENOUS at 10:11

## 2022-11-21 RX ADMIN — PROPOFOL 50 MG: 10 INJECTION, EMULSION INTRAVENOUS at 10:11

## 2022-11-21 RX ADMIN — MIDAZOLAM HYDROCHLORIDE 2 MG: 1 INJECTION, SOLUTION INTRAMUSCULAR; INTRAVENOUS at 09:11

## 2022-11-21 RX ADMIN — ONDANSETRON 4 MG: 4 TABLET, ORALLY DISINTEGRATING ORAL at 07:11

## 2022-11-21 RX ADMIN — PHENYLEPHRINE HYDROCHLORIDE 50 MCG: 10 INJECTION INTRAVENOUS at 10:11

## 2022-11-21 RX ADMIN — ROCURONIUM BROMIDE 20 MG: 10 INJECTION, SOLUTION INTRAVENOUS at 10:11

## 2022-11-21 RX ADMIN — FENTANYL CITRATE 50 MCG: 50 INJECTION, SOLUTION INTRAMUSCULAR; INTRAVENOUS at 09:11

## 2022-11-21 RX ADMIN — PHENYLEPHRINE HYDROCHLORIDE 100 MCG: 10 INJECTION INTRAVENOUS at 10:11

## 2022-11-21 RX ADMIN — SODIUM CHLORIDE, SODIUM GLUCONATE, SODIUM ACETATE, POTASSIUM CHLORIDE AND MAGNESIUM CHLORIDE: 526; 502; 368; 37; 30 INJECTION, SOLUTION INTRAVENOUS at 09:11

## 2022-11-21 RX ADMIN — ENOXAPARIN SODIUM 30 MG: 30 INJECTION SUBCUTANEOUS at 07:11

## 2022-11-21 RX ADMIN — KETOROLAC TROMETHAMINE 15 MG: 30 INJECTION, SOLUTION INTRAMUSCULAR at 11:11

## 2022-11-21 RX ADMIN — HYDROMORPHONE HYDROCHLORIDE 0.5 MG: 2 INJECTION, SOLUTION INTRAMUSCULAR; INTRAVENOUS; SUBCUTANEOUS at 11:11

## 2022-11-21 RX ADMIN — GABAPENTIN 300 MG: 300 CAPSULE ORAL at 07:11

## 2022-11-21 RX ADMIN — LIDOCAINE HYDROCHLORIDE 4 ML: 20 INJECTION, SOLUTION EPIDURAL; INFILTRATION; INTRACAUDAL; PERINEURAL at 09:11

## 2022-11-21 RX ADMIN — ONDANSETRON 4 MG: 2 INJECTION INTRAMUSCULAR; INTRAVENOUS at 11:11

## 2022-11-21 RX ADMIN — CEFAZOLIN SODIUM 2 G: 2 SOLUTION INTRAVENOUS at 10:11

## 2022-11-21 RX ADMIN — KETOROLAC TROMETHAMINE 15 MG: 30 INJECTION, SOLUTION INTRAMUSCULAR; INTRAVENOUS at 11:11

## 2022-11-21 RX ADMIN — ACETAMINOPHEN 1000 MG: 500 TABLET ORAL at 07:11

## 2022-11-21 RX ADMIN — PROPOFOL 150 MG: 10 INJECTION, EMULSION INTRAVENOUS at 09:11

## 2022-11-21 RX ADMIN — ESMOLOL HYDROCHLORIDE 10 MG: 100 INJECTION, SOLUTION INTRAVENOUS at 10:11

## 2022-11-21 RX ADMIN — SUGAMMADEX 200 MG: 100 INJECTION, SOLUTION INTRAVENOUS at 11:11

## 2022-11-21 RX ADMIN — ROCURONIUM BROMIDE 50 MG: 10 INJECTION, SOLUTION INTRAVENOUS at 09:11

## 2022-11-21 NOTE — TRANSFER OF CARE
"Anesthesia Transfer of Care Note    Patient: Adenike Sauceda    Procedure(s) Performed: Procedure(s) (LRB):  ROBOTIC REPAIR, HERNIA, INCISIONAL (N/A)    Patient location: PACU    Anesthesia Type: general    Transport from OR: Transported from OR on room air with adequate spontaneous ventilation    Post pain: adequate analgesia    Post assessment: no apparent anesthetic complications and tolerated procedure well    Post vital signs: stable    Level of consciousness: sedated    Nausea/Vomiting: no nausea/vomiting    Complications: none    Transfer of care protocol was followed      Last vitals:   Visit Vitals  /75 (BP Location: Right arm, Patient Position: Lying)   Pulse 95   Temp 36.5 °C (97.7 °F) (Skin)   Resp 18   Ht 5' 3" (1.6 m)   Wt 93.4 kg (206 lb)   LMP  (LMP Unknown)   SpO2 95%   Breastfeeding No   BMI 36.49 kg/m²     "

## 2022-11-21 NOTE — DISCHARGE SUMMARY
[facility] Periop Services  Discharge Note  Short Stay    Procedure(s) (LRB):  ROBOTIC REPAIR, HERNIA, INCISIONAL (N/A)      OUTCOME: Patient tolerated treatment/procedure well without complication and is now ready for discharge.    DISPOSITION: Home or Self Care    FINAL DIAGNOSIS:  Incisional hernia, without obstruction or gangrene    FOLLOWUP: In clinic    DISCHARGE INSTRUCTIONS:  No discharge procedures on file.      Clinical Reference Documents Added to Patient Instructions         Document    HERNIA REPAIR DISCHARGE INSTRUCTIONS (ENGLISH)            TIME SPENT ON DISCHARGE:    minutes

## 2022-11-21 NOTE — ANESTHESIA PREPROCEDURE EVALUATION
"                                                                                                             11/20/2022  Adenike Sauceda is a 67 y.o.,Obese  female presents with Incisional Hernia .  Diagnosis:        Incisional hernia, without obstruction or gangrene       (Incisional hernia, without obstruction or gangrene [K43.2])    She comes to [facility] OR for the noted procedure under GETA.  Procedure:   ROBOTIC REPAIR, HERNIA, INCISIONAL , with Mesh(Abdomen)    PMHx:  Other Medical History   Hypertension History of hepatitis C   Incisional hernia GERD (gastroesophageal reflux disease)   Arthritis Anemia   Anxiety Gastric ulcer   Obesity          PSHx/Surgical History:  PYLORIC ANTRECTOMY L/R Truncal Vagotomy   CHOLECYSTECTOMY KIDNEY STONE SURGERY   ESOPHAGOGASTRODUODENOSCOPY COLONOSCOPY   Pyloric Sphincter Dilation          Vital signs:  Pre Vitals  Current as of 11/21/22 0848  BP: 160/69 Pulse: 80   Resp: 18 SpO2: 91   Temp: 36.7 °C (98 °F)   Height: 5' 3" (1.6 m) (11/14/22) Weight: 93.4 kg (206 lb) (11/14/22)   BMI: 36.5 IBW: 52.4 kg (115 lb 7.7 oz)   Last edited 11/21/22 0706 by VLADIMIR      Lab Data:      EKG:        Pre-op Assessment    I have reviewed the Patient Summary Reports.     I have reviewed the Nursing Notes. I have reviewed the NPO Status.   I have reviewed the Medications.     Review of Systems  Anesthesia Hx:  No problems with previous Anesthesia    Social:  Non-Smoker    Hematology/Oncology:  Hematology Normal   Oncology Normal     EENT/Dental:EENT/Dental Normal   Cardiovascular:   Exercise tolerance: good Hypertension  Functional Capacity good / => 4 METS    Pulmonary:  Pulmonary Normal    Renal/:  Renal/ Normal     Hepatic/GI:   PUD, GERD Liver Disease, Hepatitis    Musculoskeletal:  Musculoskeletal Normal    Neurological:  Neurology Normal    Endocrine:  Endocrine Normal    Dermatological:  Skin Normal    Psych:  Psychiatric Normal           Physical Exam  General: Alert, Oriented, Well " nourished and Cooperative    Airway:  Mallampati: II   Mouth Opening: Normal  TM Distance: Normal  Tongue: Normal  Neck ROM: Normal ROM    Dental:  Intact    Chest/Lungs:  Clear to auscultation, Normal Respiratory Rate    Heart:  Rate: Normal  Rhythm: Regular Rhythm        Anesthesia Plan  Type of Anesthesia, risks & benefits discussed:    Anesthesia Type: Gen ETT  Intra-op Monitoring Plan: Standard ASA Monitors  Post Op Pain Control Plan: IV/PO Opioids PRN  Induction:  IV and Inhalation  Airway Plan: Direct  Informed Consent: Informed consent signed with the Patient and all parties understand the risks and agree with anesthesia plan.  All questions answered. Patient consented to blood products? Yes  ASA Score: 3  Day of Surgery Review of History & Physical: H&P Update referred to the surgeon/provider.    Ready For Surgery From Anesthesia Perspective.     .

## 2022-11-21 NOTE — OP NOTE
Date: 11/21/22    Surgeon:  Thanh Nassar MD    Assistant:  None    Preoperative Diagnosis: Incarcerated incisional hernia    Postoperative Diagnosis: Same    Procedure: Laparoscopic/robotic incarcerated incisional hernia repair with mesh    Anesthesia: GETA    Estimated Blood Loss: 15cc           Intraoperative findings: Incisional hernia containing incarcerated omentum, completely reduced,  defect closed primarily and reinforced with underlay mesh reinforcement using a circular 12cm [place] IP permanent mesh    Procedure Details: After informed consent was obtained the patient was brought to the operating room placed in the supine position.  General endotracheal anesthesia was administered without difficulty.  The patient's abdomen was prepped and draped in sterile fashion.  After infiltration with local anesthesia right upper quadrant incision was made and an optical trocar was used to enter the peritoneal cavity under direct vision.  Pneumoperitoneum was achieved without difficulty.  Additional robotic ports were placed in the right lateral abdomen under direct vision.  The patient was tilted towards the left.  The hernia defect was identified, it measured approximately 5cm in maximum diameter.  The contents were reduced along with the hernia sac.  .  Insufflation was decreased to 8 mmHg.  The hernia defect was closed primarily using running 0 V-lock suture.  Using a ruler internally, I measured for a 3-4 cm over lap of the fascial closure, a 12cm circular [place] IP mesh was then selected and inserted into the abdominal cavity after being soaked in antimicrobial solution.  It was suspended to the repair using 3-0 absorbable V-lock.  It was then secured circumferentially using running 0 V-lock suture, with care to avoid any folding or redundancy of the mesh.  The absorbable suture was then used to secure the interior of the mesh to the posterior abdominal wall eliminating the dead space.  The suture lines were  hemostatic.  The mesh was in excellent position.  Liposomal bupivacaine was then injected around the suture line in the preperitoneal position under direct vision.  Ports were removed, pneumoperitoneum was relieved, port sites were closed with absorbable suture and sterile dressings were applied.  The patient tolerated the procedure well.    Thanh Nassar MD  Surgical Oncology  Complex General, Gastrointestinal and Hepatobiliary Surgery

## 2022-11-21 NOTE — DISCHARGE INSTRUCTIONS
-NO driving and NO alcohol consumption for 24 hours and while taking narcotic pain medications.    -If you have a blue armband, you have received Exparel. Exparel is a medication that is used to ease pain at the incision sites. It can last up to 5 days. (see attached)    -Keep sites clean and dry for 2 days. Ok to shower afterwards. Do not submerge sites under water.    -NO heavy lifting. Do not lift objects greater than 10 lbs. Use caution when bending, pulling, pushing, lifting.    -Monitor sites for infection: redness, swelling, drainage/pus/foul odor, fever, chills.    -Report to your nearest ER if you experience and/or notify your provider if you experience any SUDDEN/SEVERE chest/abdominal pain, weakness, trouble breathing, uncontrolled pain.

## 2022-11-21 NOTE — ANESTHESIA PROCEDURE NOTES
Intubation    Date/Time: 11/21/2022 10:00 AM  Performed by: Cristiano Espinoza CRNA  Authorized by: Sulaiman Jefferson MD     Intubation:     Induction:  Intravenous    Intubated:  Postinduction    Mask Ventilation:  Easy mask    Attempts:  1    Attempted By:  Student    Method of Intubation:  Direct    Blade:  Danish 3    Laryngeal View Grade: Grade IIA - cords partially seen      Difficult Airway Encountered?: No      Complications:  None    Airway Device:  Oral endotracheal tube    Airway Device Size:  7.0    Style/Cuff Inflation:  Cuffed (inflated to minimal occlusive pressure)    Tube secured:  21    Secured at:  The lips    Placement Verified By:  Capnometry    Complicating Factors:  None    Findings Post-Intubation:  BS equal bilateral and atraumatic/condition of teeth unchanged

## 2022-11-22 VITALS
OXYGEN SATURATION: 97 % | TEMPERATURE: 98 F | DIASTOLIC BLOOD PRESSURE: 79 MMHG | HEART RATE: 82 BPM | WEIGHT: 206 LBS | BODY MASS INDEX: 36.5 KG/M2 | HEIGHT: 63 IN | RESPIRATION RATE: 20 BRPM | SYSTOLIC BLOOD PRESSURE: 161 MMHG

## 2022-11-22 LAB
ESTROGEN SERPL-MCNC: NORMAL PG/ML
INSULIN SERPL-ACNC: NORMAL U[IU]/ML
LAB AP CLINICAL INFORMATION: NORMAL
LAB AP GROSS DESCRIPTION: NORMAL
LAB AP REPORT FOOTNOTES: NORMAL
T3RU NFR SERPL: NORMAL %

## 2022-12-06 ENCOUNTER — OFFICE VISIT (OUTPATIENT)
Dept: SURGICAL ONCOLOGY | Facility: CLINIC | Age: 67
End: 2022-12-06
Payer: MEDICARE

## 2022-12-06 DIAGNOSIS — K43.2 INCISIONAL HERNIA, WITHOUT OBSTRUCTION OR GANGRENE: Primary | ICD-10-CM

## 2022-12-06 PROCEDURE — 99024 PR POST-OP FOLLOW-UP VISIT: ICD-10-PCS | Mod: POP,,, | Performed by: NURSE PRACTITIONER

## 2022-12-06 PROCEDURE — 99024 POSTOP FOLLOW-UP VISIT: CPT | Mod: POP,,, | Performed by: NURSE PRACTITIONER

## 2022-12-06 NOTE — PROGRESS NOTES
POST OP LAP/RADHA INCISIONAL HERNIA REPAIR WITH MESH    PT DOING VERY WELL  REPORTS SOME SWELLING LEFT ABD AT INCISION SITE  NO FEVER OR CHILLS  SORENESS AS EXPECTED  EATING WELL  HAVING BMS    ABD SOFT  MODERATE SIZE HEMATOMA NOTED AT LEFT PORT SITE  ECCYMOSIS RESOLVING  NO SIGNS OF INFECTION  PT REPORTS GETTING SMALLER  ALL OTHER INCISIONS HEALING WELL    RESTRICTIONS DISCUSSED AND QUESTIONS ANSWERED  PT PLEASED   WILL CALL WITH ANY PROBLEMS    RTC PRN

## 2022-12-20 ENCOUNTER — OFFICE VISIT (OUTPATIENT)
Dept: SURGICAL ONCOLOGY | Facility: CLINIC | Age: 67
End: 2022-12-20
Payer: MEDICARE

## 2022-12-20 DIAGNOSIS — K43.2 INCISIONAL HERNIA, WITHOUT OBSTRUCTION OR GANGRENE: Primary | ICD-10-CM

## 2022-12-20 PROCEDURE — 99024 PR POST-OP FOLLOW-UP VISIT: ICD-10-PCS | Mod: POP,,, | Performed by: NURSE PRACTITIONER

## 2022-12-20 PROCEDURE — 99024 POSTOP FOLLOW-UP VISIT: CPT | Mod: POP,,, | Performed by: NURSE PRACTITIONER

## 2022-12-20 NOTE — PROGRESS NOTES
INCISION CHECK    PT REPORTS ONE OF LAPAROSCOPIC PORT SITES OPENED  REPORTS SOME PINK DRAINAGE  HAS BEEN KEEPING DRY DRESSING ON IT  DENIES FEVER OR CHILLS    LEFT PORT SITE OPENED, NICKEL SIZE  PREVIOUS HEMATOMA AT SITE  OLD CLOT EXPRESSED  NO SIGN OF INFECTION  GRANULATION TISSUE NOTED  PT CURRENTLY ON AB FOR UTI    WET TO DRY DRESSING  PT WILL CONTINUE AT HOME    RTC ONE WEEK FOR RECHECK

## 2022-12-29 ENCOUNTER — DOCUMENTATION ONLY (OUTPATIENT)
Dept: SURGICAL ONCOLOGY | Facility: CLINIC | Age: 67
End: 2022-12-29
Payer: MEDICARE

## 2022-12-29 NOTE — PROGRESS NOTES
Patient presents to office for wound check.  She has continued doing wet to dry dressing changes daily.  There is granulation tissue noted, she tells me it does still drain some pink drainage but that the incision is getting smaller in her opinion.  Dressing changed, notified her to continue doing the wet to dry dressing daily.  She will follow up with our nurse practitioner next week for re-check.

## 2023-01-04 ENCOUNTER — OFFICE VISIT (OUTPATIENT)
Dept: SURGICAL ONCOLOGY | Facility: CLINIC | Age: 68
End: 2023-01-04
Payer: MEDICARE

## 2023-01-04 DIAGNOSIS — K43.2 INCISIONAL HERNIA, WITHOUT OBSTRUCTION OR GANGRENE: Primary | ICD-10-CM

## 2023-01-04 PROCEDURE — 99024 PR POST-OP FOLLOW-UP VISIT: ICD-10-PCS | Mod: POP,,, | Performed by: NURSE PRACTITIONER

## 2023-01-04 PROCEDURE — 99024 POSTOP FOLLOW-UP VISIT: CPT | Mod: POP,,, | Performed by: NURSE PRACTITIONER

## 2023-01-04 NOTE — PROGRESS NOTES
WOUND FOLLOW UP    MINIMAL OPENING AT LEFT ABD WOUND SITE  SOME EXCESS GRANULATION TISSUE NOTED  NO EVIDENCE OF INFECTION  PT REPORTS HAS BEEN DOING WELL AT HOME    AREA CLEANED  SILVER NITRATE TO EXCESS TISSUE  PT TOLERATED WELL    WILL RETURN AS NEEDED

## 2023-03-09 ENCOUNTER — TELEPHONE (OUTPATIENT)
Dept: ADMINISTRATIVE | Facility: HOSPITAL | Age: 68
End: 2023-03-09
Payer: MEDICARE

## 2023-03-09 DIAGNOSIS — E04.1 THYROID NODULE: Primary | ICD-10-CM

## 2023-03-09 NOTE — TELEPHONE ENCOUNTER
----- Message from Ya Gambino sent at 3/9/2023  8:49 AM CST -----  Regarding: US [facility] PT         Pt LVM stating that she needs a thyroid ultrasound before her appt on 4/5/23.  Pt #  [phone]

## 2023-03-28 ENCOUNTER — HOSPITAL ENCOUNTER (OUTPATIENT)
Dept: RADIOLOGY | Facility: HOSPITAL | Age: 68
Discharge: HOME | End: 2023-03-28
Attending: NURSE PRACTITIONER
Payer: MEDICARE

## 2023-03-28 DIAGNOSIS — E04.1 THYROID NODULE: ICD-10-CM

## 2023-03-28 PROCEDURE — 76536 US EXAM OF HEAD AND NECK: CPT | Mod: TC

## 2023-04-05 ENCOUNTER — OFFICE VISIT (OUTPATIENT)
Dept: ENDOCRINOLOGY | Facility: CLINIC | Age: 68
End: 2023-04-05
Payer: MEDICARE

## 2023-04-05 VITALS
DIASTOLIC BLOOD PRESSURE: 82 MMHG | BODY MASS INDEX: 36.52 KG/M2 | WEIGHT: 206.13 LBS | SYSTOLIC BLOOD PRESSURE: 135 MMHG | RESPIRATION RATE: 20 BRPM | HEART RATE: 92 BPM | TEMPERATURE: 98 F | HEIGHT: 63 IN

## 2023-04-05 DIAGNOSIS — E04.2 MULTINODULAR GOITER: Primary | ICD-10-CM

## 2023-04-05 LAB
T4 FREE SERPL-MCNC: 1.11 NG/DL (ref 0.7–1.48)
TSH SERPL-ACNC: 0.96 UIU/ML (ref 0.35–4.94)

## 2023-04-05 PROCEDURE — 99213 PR OFFICE/OUTPT VISIT, EST, LEVL III, 20-29 MIN: ICD-10-PCS | Mod: S$PBB,,, | Performed by: NURSE PRACTITIONER

## 2023-04-05 PROCEDURE — 99213 OFFICE O/P EST LOW 20 MIN: CPT | Mod: S$PBB,,, | Performed by: NURSE PRACTITIONER

## 2023-04-05 PROCEDURE — 84443 ASSAY THYROID STIM HORMONE: CPT | Performed by: NURSE PRACTITIONER

## 2023-04-05 PROCEDURE — 99215 OFFICE O/P EST HI 40 MIN: CPT | Mod: PBBFAC | Performed by: NURSE PRACTITIONER

## 2023-04-05 PROCEDURE — 84439 ASSAY OF FREE THYROXINE: CPT | Performed by: NURSE PRACTITIONER

## 2023-04-05 PROCEDURE — 36415 COLL VENOUS BLD VENIPUNCTURE: CPT | Performed by: NURSE PRACTITIONER

## 2023-04-05 RX ORDER — LANOLIN ALCOHOL/MO/W.PET/CERES
1 CREAM (GRAM) TOPICAL 2 TIMES DAILY
COMMUNITY

## 2023-04-05 RX ORDER — LOSARTAN POTASSIUM 50 MG/1
50 TABLET ORAL
COMMUNITY

## 2023-04-05 NOTE — PROGRESS NOTES
Subjective     Patient ID: Adenike Sauceda is a 67 y.o. female.    Chief Complaint: multinodular thyroid follow up    08/18/2018 Endocrine Note: 64 y/o here for f/u of a MNG.  Pt previously was evaluated over the past few yrs for a MNG.  Has had benign FNA.  Serial u/s showed heterogeneity and small lesions which have waxed and waned and per last visit last year elected to continue to follow.  Recent repeat u/s is pending.  Pt endorses today overall is doing well.  Hasn't noticed any major changes.  Is still having cold intolerance and alopecia but isn't new.  Not endorsing significant exacerbants/alleviants.  Explains that her PCP regularly checks TFTs, this year included, and were wnl.     11/01/2018 Endocrine Clinic Note: 64yo WF with history of MNG. Has had multiple benign of BL nodules in the past. Here today for possible FNA after recent US noting enlarging nodules. She notes no changes. No neck complaints. No compressive symptoms. [1]     11/13/2018 Endocrine Clinic Note: 64 year old female here today as F/U in Endocrine Clinic for Multinodular goiter. US 10/31/2019 IMPRESSION: Multinodular thyroid. None of the nodules meet TIRADS criteria for biopsy at this time. A one year follow-up ultrasound is recommended for surveillance. Pt had previous FNA that was Benign round 2013/2014 x 2.  Pt reports she lost her voice around July with hoarseness. Pt was given Z-krystin/ steroid with no improvement. Pt saw Dr. Norton and was started on Protonix 6 weeks with improved hoarseness. Pt denies any difficulty swallowing, Pt has had long standing symptoms of hair loss, dry skin for several years with previous normal thyroid functions likely estrogen Def.    04/05/2022 Endocrine Clinic Note: 66 year old female scheduled today for endocrine clinic follow-up for history of multinodular goiter.  Previous ultrasound 10/31/2019 Multinodular thyroid. None of the nodules meet TIRADS criteria for biopsy at this time. A one year  follow-up ultrasound is recommended for surveillance. Pt had previous FNA that was Benign round 2013/2014 x 2.  Patient denies any new palpable nodules.  Patient is due for repeat follow-up ultrasound will order ultrasound follow-up.     Endocrine clinic follow-up 04/05/2023:  67 year female scheduled today for endocrine clinic follow-up.  History of multinodular goiter. Pt had previous FNA that was Benign round 2013/2014 x 2.  Patient had follow-up ultrasound 04/19/2022 Impression: Multinodular goiter similar to prior  NODULES: Up to two most suspicious nodules in each lobe detailed. Isoechoic 2.6 cm solid nodule at the mid right lobe (TR 3); previously 2.5 cm by my measurements. Hypoechoic 1 cm nodule at the medial mid right lobe (TR 4). Heterogeneous, hypoechoic nodule at the inferior left lobe measures 2.5 cm (TR 4); previously 2.4 cm. Heterogeneous isoechoic nodule at the upper left lobe measures 1.6 cm (TR 3).exam. Pt denies any new palpable nodules.  No recent thyroid labs will complete TSH, free T4 today.        US Thyroid  Order: [id number]  Status: Final result     Visible to patient: Yes (not seen)     Next appt: Today at 08:30 AM in Endocrinology (Flora Borden NP)     Dx: Thyroid nodule     0 Result Notes  Details      Reading Physician Reading Date Result Priority  Melanie Rasheed MD  [phone] 3/29/2023 Routine    Narrative & Impression  EXAMINATION:  US THYROID     CLINICAL HISTORY:  .  Nontoxic single thyroid nodule     TECHNIQUE:  Ultrasound of the thyroid and cervical lymph nodes was performed.     COMPARISON:  Thyroid sonogram 04/19/2022     FINDINGS:  SIZE:     Right lobe: 4.6 x 3.1 x 2.2 cm     Left lobe: 4.8 x 2.2 x 2 cm     Isthmus: 0.5 cm     PARENCHYMA:     Heterogeneous parenchyma.     NODULES:     Up to two most suspicious nodules in each lobe detailed.     Isoechoic 2.6 cm solid nodule at the mid right lobe (TR 3); previously 2.5 cm by my measurements.     Hypoechoic 1 cm  nodule at the medial mid right lobe (TR 4).     Heterogeneous, hypoechoic nodule at the inferior left lobe measures 2.5 cm (TR 4); previously 2.4 cm.     Heterogeneous isoechoic nodule at the upper left lobe measures 1.6 cm (TR 3).     LYMPH NODES:     No enlarged lymph nodes seen     Impression:     Multinodular goiter similar to prior exam.     Reference: ACR Thyroid Imaging, Reporting and Data System (TI-RADS): White Paper of the ACR TI-RADS Committee.  2017.     TR1.  Benign.  No FNA.     TR2.  Not suspicious.  No FNA.     TR3.  Mildly suspicious.  FNA if 2.5 cm or greater.  Follow up at 1, 3 and 5 years if >/= 1.5 cm     TR4.  Moderately suspicious.  FNA if 1.5 cm or greater.  Follow up at 1, 2, 3, and 5 years if 1 cm or greater.     TR5.  Highly suspicious.  FNA if 1 cm or greater.  If 0.5 cm or greater recommend annual follow up x 5 years.        Electronically signed by: Melanie Rasheed  Date:                                            03/29/2023  Time:                                           12:09        Exam Ended: 03/28/23 09:40 Last Resulted: 03/29/23 12:09            Review of Systems   Constitutional:  Negative for activity change, appetite change and fatigue.   HENT:  Negative for dental problem, hearing loss, tinnitus, trouble swallowing and goiter.    Eyes:  Negative for photophobia, pain and visual disturbance.   Respiratory:  Negative for cough, chest tightness and wheezing.    Cardiovascular:  Negative for chest pain, palpitations and leg swelling.   Gastrointestinal:  Negative for abdominal pain, constipation, diarrhea, nausea and reflux.   Endocrine: Negative for cold intolerance, heat intolerance, polydipsia and polyphagia.   Genitourinary:  Negative for difficulty urinating, flank pain, hematuria, hot flashes, menstrual irregularity, menstrual problem, nocturia and urgency.   Musculoskeletal:  Negative for back pain, gait problem, joint swelling, leg pain and joint deformity.    Integumentary:  Negative for color change, pallor, rash and breast discharge.   Allergic/Immunologic: Negative for environmental allergies, food allergies and immunocompromised state.   Neurological:  Negative for tremors, seizures, headaches, coordination difficulties, memory loss and coordination difficulties.   Psychiatric/Behavioral:  Negative for agitation, behavioral problems and sleep disturbance. The patient is not nervous/anxious.         Objective     Physical Exam  Constitutional:       General: She is not in acute distress.     Appearance: Normal appearance. She is not ill-appearing.   HENT:      Head: Normocephalic and atraumatic.      Right Ear: External ear normal.      Left Ear: External ear normal.      Nose: Nose normal. No congestion or rhinorrhea.      Mouth/Throat:      Mouth: Mucous membranes are moist.      Pharynx: Oropharynx is clear. No oropharyngeal exudate.   Eyes:      General:         Right eye: No discharge.         Left eye: No discharge.      Conjunctiva/sclera: Conjunctivae normal.      Pupils: Pupils are equal, round, and reactive to light.   Neck:      Thyroid: No thyroid mass, thyromegaly or thyroid tenderness.   Cardiovascular:      Rate and Rhythm: Normal rate and regular rhythm.      Pulses: Normal pulses.      Heart sounds: Normal heart sounds. No murmur heard.  Pulmonary:      Effort: Pulmonary effort is normal. No respiratory distress.      Breath sounds: Normal breath sounds.   Abdominal:      General: Abdomen is flat. Bowel sounds are normal. There is no distension.      Palpations: Abdomen is soft.      Tenderness: There is no abdominal tenderness.   Musculoskeletal:         General: No swelling or tenderness. Normal range of motion.      Cervical back: Normal range of motion and neck supple. No tenderness.      Right lower leg: No edema.      Left lower leg: No edema.   Feet:      Right foot:      Skin integrity: Skin integrity normal.      Left foot:      Skin  integrity: Skin integrity normal.   Lymphadenopathy:      Cervical: No cervical adenopathy.   Skin:     General: Skin is warm and dry.      Coloration: Skin is not jaundiced or pale.   Neurological:      General: No focal deficit present.      Mental Status: She is alert and oriented to person, place, and time. Mental status is at baseline.      Coordination: Coordination normal.      Gait: Gait normal.   Psychiatric:         Mood and Affect: Mood normal.         Behavior: Behavior normal.         Thought Content: Thought content normal.          Assessment and Plan     Problem List Items Addressed This Visit    None  Visit Diagnoses       Multinodular goiter    -  Primary    Relevant Orders    T4, Free    TSH    US Thyroid            Multinodular goiter  US 03/29/2023  Impression: Multinodular goiter similar to prior exam  Continue to monitor with 1 year ultrasound  TSH, free T4 today  Return to clinic 1 year  -     T4, Free; Future; Expected date: 04/05/2023  -     TSH; Future; Expected date: 04/05/2023  -     US Thyroid; Future; Expected date: 04/01/2024          I spent a total of 20 minutes on the day of the visit.  This includes face to face time and non-face to face time preparing to see the patient (eg, review of tests), obtaining and/or reviewing separately obtained history, documenting clinical information in the electronic or other health record, independently interpreting results and communicating results to the patient/family/caregiver, or care coordinator.

## 2024-04-01 ENCOUNTER — HOSPITAL ENCOUNTER (OUTPATIENT)
Dept: RADIOLOGY | Facility: HOSPITAL | Age: 69
Discharge: HOME | End: 2024-04-01
Attending: NURSE PRACTITIONER
Payer: MEDICARE

## 2024-04-01 DIAGNOSIS — E04.2 MULTINODULAR GOITER: ICD-10-CM

## 2024-04-01 PROCEDURE — 76536 US EXAM OF HEAD AND NECK: CPT | Mod: TC

## 2024-04-04 ENCOUNTER — TELEPHONE (OUTPATIENT)
Dept: ENDOCRINOLOGY | Facility: CLINIC | Age: 69
End: 2024-04-04
Payer: MEDICARE

## 2024-04-04 NOTE — TELEPHONE ENCOUNTER
I called pt and discussed the following results and recommendations  Please instruct the patient on a thyroid ultrasound multiple nodules were identified but none meet the criteria for biopsy.  Previous larger nodule was not identified on this ultrasound.  At this time I will continue to monitor with a 1 year follow-up ultrasound.    Pt verbalizes understanding

## 2024-04-04 NOTE — TELEPHONE ENCOUNTER
----- Message from Flora Borden NP sent at 4/4/2024 12:11 PM CDT -----  Please instruct the patient on a thyroid ultrasound multiple nodules were identified but none meet the criteria for biopsy.  Previous larger nodule was not identified on this ultrasound.  At this time I will continue to monitor with a 1 year follow-up ultrasound.

## 2024-11-01 ENCOUNTER — OFFICE VISIT (OUTPATIENT)
Dept: ENDOCRINOLOGY | Facility: CLINIC | Age: 69
End: 2024-11-01
Payer: MEDICARE

## 2024-11-01 ENCOUNTER — LAB VISIT (OUTPATIENT)
Dept: LAB | Facility: HOSPITAL | Age: 69
End: 2024-11-01
Attending: NURSE PRACTITIONER
Payer: MEDICARE

## 2024-11-01 VITALS
HEIGHT: 63 IN | HEART RATE: 96 BPM | BODY MASS INDEX: 39.75 KG/M2 | SYSTOLIC BLOOD PRESSURE: 142 MMHG | RESPIRATION RATE: 12 BRPM | WEIGHT: 224.31 LBS | TEMPERATURE: 98 F | DIASTOLIC BLOOD PRESSURE: 81 MMHG

## 2024-11-01 DIAGNOSIS — E04.2 MULTINODULAR GOITER: ICD-10-CM

## 2024-11-01 DIAGNOSIS — E04.2 MULTINODULAR GOITER: Primary | ICD-10-CM

## 2024-11-01 LAB
T4 FREE SERPL-MCNC: 1.09 NG/DL (ref 0.7–1.48)
TSH SERPL-ACNC: 0.96 UIU/ML (ref 0.35–4.94)

## 2024-11-01 PROCEDURE — 36415 COLL VENOUS BLD VENIPUNCTURE: CPT

## 2024-11-01 PROCEDURE — 84439 ASSAY OF FREE THYROXINE: CPT

## 2024-11-01 PROCEDURE — 84443 ASSAY THYROID STIM HORMONE: CPT

## 2024-11-01 PROCEDURE — 99215 OFFICE O/P EST HI 40 MIN: CPT | Mod: PBBFAC | Performed by: NURSE PRACTITIONER

## 2024-11-01 RX ORDER — DULOXETIN HYDROCHLORIDE 30 MG/1
CAPSULE, DELAYED RELEASE ORAL
COMMUNITY
Start: 2024-09-24

## 2025-04-10 PROBLEM — I10 PRIMARY HYPERTENSION: Status: ACTIVE | Noted: 2025-04-10

## 2025-04-10 PROBLEM — Z00.00 WELLNESS EXAMINATION: Status: ACTIVE | Noted: 2025-04-10

## 2025-04-11 ENCOUNTER — OFFICE VISIT (OUTPATIENT)
Dept: INTERNAL MEDICINE | Facility: CLINIC | Age: 70
End: 2025-04-11
Payer: MEDICARE

## 2025-04-11 ENCOUNTER — RESEARCH ENCOUNTER (OUTPATIENT)
Dept: RESEARCH | Facility: HOSPITAL | Age: 70
End: 2025-04-11
Payer: MEDICARE

## 2025-04-11 VITALS
WEIGHT: 225 LBS | DIASTOLIC BLOOD PRESSURE: 80 MMHG | TEMPERATURE: 98 F | OXYGEN SATURATION: 99 % | BODY MASS INDEX: 39.87 KG/M2 | HEART RATE: 74 BPM | SYSTOLIC BLOOD PRESSURE: 122 MMHG | RESPIRATION RATE: 16 BRPM | HEIGHT: 63 IN

## 2025-04-11 DIAGNOSIS — Z13.6 ENCOUNTER FOR SCREENING FOR CARDIOVASCULAR DISORDERS: ICD-10-CM

## 2025-04-11 DIAGNOSIS — Z00.00 WELLNESS EXAMINATION: ICD-10-CM

## 2025-04-11 DIAGNOSIS — Z12.31 VISIT FOR SCREENING MAMMOGRAM: ICD-10-CM

## 2025-04-11 DIAGNOSIS — E04.2 MULTIPLE THYROID NODULES: ICD-10-CM

## 2025-04-11 DIAGNOSIS — R73.01 IFG (IMPAIRED FASTING GLUCOSE): ICD-10-CM

## 2025-04-11 DIAGNOSIS — Z78.0 POSTMENOPAUSAL: ICD-10-CM

## 2025-04-11 DIAGNOSIS — I10 PRIMARY HYPERTENSION: Primary | ICD-10-CM

## 2025-04-11 DIAGNOSIS — E66.01 MORBID OBESITY: ICD-10-CM

## 2025-04-11 DIAGNOSIS — Z86.19 HISTORY OF HEPATITIS C: ICD-10-CM

## 2025-04-11 LAB
ALBUMIN SERPL-MCNC: 3.4 G/DL (ref 3.4–4.8)
ALBUMIN/GLOB SERPL: 1 RATIO (ref 1.1–2)
ALP SERPL-CCNC: 171 UNIT/L (ref 40–150)
ALT SERPL-CCNC: 17 UNIT/L (ref 0–55)
ANION GAP SERPL CALC-SCNC: 12 MEQ/L
AST SERPL-CCNC: 19 UNIT/L (ref 11–45)
BASOPHILS # BLD AUTO: 0.03 X10(3)/MCL
BASOPHILS NFR BLD AUTO: 0.5 %
BILIRUB SERPL-MCNC: 0.8 MG/DL
BUN SERPL-MCNC: 10.5 MG/DL (ref 9.8–20.1)
CALCIUM SERPL-MCNC: 9.3 MG/DL (ref 8.4–10.2)
CHLORIDE SERPL-SCNC: 106 MMOL/L (ref 98–107)
CHOLEST SERPL-MCNC: 168 MG/DL
CHOLEST/HDLC SERPL: 2 {RATIO} (ref 0–5)
CO2 SERPL-SCNC: 23 MMOL/L (ref 23–31)
CREAT SERPL-MCNC: 0.59 MG/DL (ref 0.55–1.02)
CREAT/UREA NIT SERPL: 18
EOSINOPHIL # BLD AUTO: 0.17 X10(3)/MCL (ref 0–0.9)
EOSINOPHIL NFR BLD AUTO: 2.6 %
ERYTHROCYTE [DISTWIDTH] IN BLOOD BY AUTOMATED COUNT: 15.9 % (ref 11.5–17)
EST. AVERAGE GLUCOSE BLD GHB EST-MCNC: 105.4 MG/DL
GFR SERPLBLD CREATININE-BSD FMLA CKD-EPI: >60 ML/MIN/1.73/M2
GLOBULIN SER-MCNC: 3.5 GM/DL (ref 2.4–3.5)
GLUCOSE SERPL-MCNC: 132 MG/DL (ref 82–115)
HBA1C MFR BLD: 5.3 %
HCT VFR BLD AUTO: 38.4 % (ref 37–47)
HDLC SERPL-MCNC: 78 MG/DL (ref 35–60)
HGB BLD-MCNC: 12.3 G/DL (ref 12–16)
IMM GRANULOCYTES # BLD AUTO: 0.03 X10(3)/MCL (ref 0–0.04)
IMM GRANULOCYTES NFR BLD AUTO: 0.5 %
LDLC SERPL CALC-MCNC: 72 MG/DL (ref 50–140)
LYMPHOCYTES # BLD AUTO: 1.51 X10(3)/MCL (ref 0.6–4.6)
LYMPHOCYTES NFR BLD AUTO: 23.5 %
MCH RBC QN AUTO: 27.3 PG (ref 27–31)
MCHC RBC AUTO-ENTMCNC: 32 G/DL (ref 33–36)
MCV RBC AUTO: 85.3 FL (ref 80–94)
MONOCYTES # BLD AUTO: 0.41 X10(3)/MCL (ref 0.1–1.3)
MONOCYTES NFR BLD AUTO: 6.4 %
NEUTROPHILS # BLD AUTO: 4.27 X10(3)/MCL (ref 2.1–9.2)
NEUTROPHILS NFR BLD AUTO: 66.5 %
NRBC BLD AUTO-RTO: 0 %
PLATELET # BLD AUTO: 317 X10(3)/MCL (ref 130–400)
PLATELETS.RETICULATED NFR BLD AUTO: 2.8 % (ref 0.9–11.2)
PMV BLD AUTO: 10.5 FL (ref 7.4–10.4)
POTASSIUM SERPL-SCNC: 4.2 MMOL/L (ref 3.5–5.1)
PROT SERPL-MCNC: 6.9 GM/DL (ref 5.8–7.6)
RBC # BLD AUTO: 4.5 X10(6)/MCL (ref 4.2–5.4)
SODIUM SERPL-SCNC: 141 MMOL/L (ref 136–145)
T4 FREE SERPL-MCNC: 1.3 NG/DL (ref 0.7–1.48)
TRIGL SERPL-MCNC: 90 MG/DL (ref 37–140)
TSH SERPL-ACNC: 0.99 UIU/ML (ref 0.35–4.94)
VLDLC SERPL CALC-MCNC: 18 MG/DL
WBC # BLD AUTO: 6.42 X10(3)/MCL (ref 4.5–11.5)

## 2025-04-11 PROCEDURE — 85025 COMPLETE CBC W/AUTO DIFF WBC: CPT | Performed by: INTERNAL MEDICINE

## 2025-04-11 PROCEDURE — 84439 ASSAY OF FREE THYROXINE: CPT | Performed by: INTERNAL MEDICINE

## 2025-04-11 PROCEDURE — 84443 ASSAY THYROID STIM HORMONE: CPT | Performed by: INTERNAL MEDICINE

## 2025-04-11 PROCEDURE — 83036 HEMOGLOBIN GLYCOSYLATED A1C: CPT | Performed by: INTERNAL MEDICINE

## 2025-04-11 PROCEDURE — 80061 LIPID PANEL: CPT | Performed by: INTERNAL MEDICINE

## 2025-04-11 PROCEDURE — 80053 COMPREHEN METABOLIC PANEL: CPT | Performed by: INTERNAL MEDICINE

## 2025-04-11 PROCEDURE — 36415 COLL VENOUS BLD VENIPUNCTURE: CPT | Performed by: INTERNAL MEDICINE

## 2025-04-11 RX ORDER — MELOXICAM 15 MG/1
15 TABLET ORAL DAILY
COMMUNITY
Start: 2025-02-11

## 2025-04-11 RX ORDER — VITAMIN A 3000 MCG
CAPSULE ORAL
COMMUNITY
Start: 2022-01-01

## 2025-04-11 RX ORDER — VERAPAMIL HCL 240 MG
240 TABLET, EXTENDED RELEASE ORAL EVERY MORNING
COMMUNITY
Start: 2025-03-27

## 2025-04-11 NOTE — LETTER
AUTHORIZATION FOR RELEASE OF   CONFIDENTIAL INFORMATION    Dear CLAUDY,    We are seeing Adenike Sauceda, date of birth 1955, in the clinic at [address]. Nav Gomez II, MD is the patient's PCP. Adenike Sauceda has an outstanding lab/procedure at the time we reviewed her chart. In order to help keep her health information updated, she has authorized us to request the following medical record(s):        (  )  MAMMOGRAM                                      ( X )  COLONOSCOPY/EGD reports      (  )  PAP SMEAR                                          (  )  OUTSIDE LAB RESULTS     (  )  DEXA SCAN                                          (  )  EYE EXAM            (  )  FOOT EXAM                                          (  )  ENTIRE RECORD     (  )  OUTSIDE IMMUNIZATIONS                 (  )  _______________         Please fax records to [facility], Vo[place]ier, Thomas L II, MD,[phone]     If you have any questions, please contact [phone]        Patient Name: Adenike Sauceda  : 1955  Patient Phone #: [phone]

## 2025-04-11 NOTE — RESEARCH
Study Title: JOCY: Real-world Evidence to Advance Multi-Cancer Early Detection Health Equity (REACH/Galleri-Medicare study)    Protocol IRB #: 2024.114     Sponsor: [employer]    : Sulaiman Dick MD    Patient eligibility was checked prior to enrollment in the study. Patient met the following inclusion and exclusion criteria: Patient was viewing consent and information before her visit. Once she was needing a blood draw from her doctors visit she wanted to get the test done at the same time. Got her back, consented and blood drawn.     INCLUSION CRITERIA  Participant age is 50 years or older with Medicare coverage at the time of signing the Informed Consent form  Participant is eligible to receive the Galleri test, based on a determination by the study investigator or designee  Participant is capable of giving signed Informed Consent that is legally effective (consent provided by LAR is not permitted)  Participant is able to comprehend and respond to questions in participant questionnaires.    EXCLUSION CRITERIA  Participant having had a previous Galleri test not associated with this study  Participant is undergoing or referred for diagnostic evaluation due to clinical suspicion for cancer  Participant has a personal history of invasive or hematologic malignancy, diagnosed within the last 3 years prior to expected enrollment date or diagnosed greater than 3 years prior to expected enrollment date and never treated  Participant has had definitive treatment for invasive or hematologic malignancy within the 3 years prior to expected enrollment date  Participant is not able to comply with protocol procedures  Participant is not a current patient at a participating center  Participant is currently enrolled or was previously enrolled in another [employer]-sponsored study  Participant is current or previous employee/contractor of [employer]  Participant is currently pregnant (by participant's self-report of  pregnancy status)    DOCUMENTATION OF INFORMED CONSENT    Prior to the Informed Consent (IC) being signed, or any study protocol required data collection, testing, procedure, or intervention being performed, the following was done and/or discussed:  Patient was given a copy of the IC for review   Purpose of the study and qualifications to participate   Study design, Follow up schedule, and tests or procedures done at each visit  Confidentiality and HIPAA Authorization for Release of Medical Records for the research trial/ subject's rights/research related injury  Risk, Benefits, Alternative Treatments, Compensation and Costs  Participation in the research trial is voluntary and patient may withdraw at anytime  Contact information for study related questions    Patient verbalizes understanding of the above: Yes  Contact information for CRC and PI given to patient: Yes  Patient able to adequately summarize: the purpose of the study, the risks associated with the study, and all procedures, testing, and follow-ups associated with the study: Yes    Patient signed the informed consent form for the research study with an IRB approval date of Jul 02, 2024. Each page of the consent form was reviewed with patient and all questions answered satisfactorily. Patient received a copy of the consent form. The original consent was scanned into electronic medical records.    INSURANCE VERIFICATION    Thoroughly discussed with patient that the study team does not expect them to be billed for this test. However, by participating in this trial, we cannot guarantee that they will not receive a bill, as cost ultimately depends on the details of their Medicare coverage. Patient voiced understanding.      BLOOD DRAW    Following IC being signed and prior to blood draw, patient completed all baseline/pretest questionnaires. The following specimens were collected from the pt at the time of this encounter via peripheral blood draw.    Blood  draw location: Left Arm  Needle used: 23 gauge butterfly needle  Blood draw amount: 20ml  Blood draw time: 11:23 AM

## 2025-04-11 NOTE — PROGRESS NOTES
Subjective:       Patient ID: Adenike Sauceda is a 69 y.o. female.      Patient Care Team:  Nav Gomez II, MD as PCP - General (Internal Medicine)  Flora Borden NP as Nurse Practitioner (Endocrinology)    Chief Complaint: Establish Care    69-year-old white female is here for initial visit to establish care and for wellness exam.  She has a past medical history of thyroid nodules in his followed yearly by Endocrinology.  She has a history of a gastric ulcer about 3 years ago when she was taking BC powder regularly for back pain.  She has chronic lower back pain, never had surgery, nonradiating.  She has a history of hypertension and obesity among other conditions.  She has a history of hepatitis-C treated in 2004.  Besides back pain, she feels well today.  No chest pain or shortness of breath.    She is  and has 3 children.      Review of Systems   Constitutional:  Negative for fever.   HENT:  Negative for nosebleeds.    Eyes:  Negative for visual disturbance.   Respiratory:  Negative for shortness of breath.    Cardiovascular:  Negative for chest pain.   Gastrointestinal:  Negative for abdominal pain.   Genitourinary:  Negative for dysuria.   Musculoskeletal:  Positive for arthralgias and back pain. Negative for gait problem.   Neurological:  Negative for headaches.             Objective:      Physical Exam  Constitutional:       Appearance: She is obese.   HENT:      Head: Normocephalic.      Mouth/Throat:      Pharynx: Oropharynx is clear.   Eyes:      Extraocular Movements: Extraocular movements intact.   Cardiovascular:      Rate and Rhythm: Normal rate and regular rhythm.   Pulmonary:      Breath sounds: Normal breath sounds.   Abdominal:      Palpations: Abdomen is soft.   Musculoskeletal:         General: No swelling.   Skin:     General: Skin is warm.   Neurological:      General: No focal deficit present.      Mental Status: She is alert and oriented to person, place, and time.  "  Psychiatric:         Mood and Affect: Mood normal.         Visit Vitals  /80   Pulse 74   Temp 98 °F (36.7 °C)   Resp 16   Ht 5' 3" (1.6 m)   Wt 102.1 kg (225 lb)   LMP  (LMP Unknown)   SpO2 99%   BMI 39.86 kg/m²        Assessment:       Problem List Items Addressed This Visit       Primary hypertension - Primary    Relevant Orders    CBC Auto Differential    Comprehensive Metabolic Panel    Lipid Panel    Urinalysis, Reflex to Urine Culture    T4, Free    TSH    Wellness examination    Relevant Orders    CBC Auto Differential    Comprehensive Metabolic Panel    Lipid Panel    Urinalysis, Reflex to Urine Culture    T4, Free    TSH    Morbid obesity    Relevant Orders    CBC Auto Differential    Comprehensive Metabolic Panel    Lipid Panel    Urinalysis, Reflex to Urine Culture    T4, Free    TSH    Multiple thyroid nodules    Relevant Orders    CBC Auto Differential    Comprehensive Metabolic Panel    Lipid Panel    Urinalysis, Reflex to Urine Culture    T4, Free    TSH    History of hepatitis C    Relevant Orders    CBC Auto Differential    Comprehensive Metabolic Panel    Lipid Panel    Urinalysis, Reflex to Urine Culture    T4, Free    TSH       Medication List with Changes/Refills   Current Medications    AMLODIPINE (NORVASC) 2.5 MG TABLET    Take 2.5 mg by mouth once daily.    ASCORBIC ACID, VITAMIN C, (VITAMIN C) 1000 MG TABLET    Take 1,000 mg by mouth once daily.    B COMPLEX VITAMINS CAPSULE    Take 1 capsule by mouth once daily.    CHOLECALCIFEROL, VITAMIN D3, 100 MCG (4,000 UNIT) CAP CAPSULE    Take by mouth once daily.    DOCOSAHEXAENOIC ACID ORAL    daily for 3 days.    DULOXETINE (CYMBALTA) 30 MG CAPSULE    Take by mouth.    DULOXETINE (CYMBALTA) 60 MG CAPSULE    Take 60 mg by mouth Daily.    FAMOTIDINE (PEPCID) 20 MG TABLET    Take 20 mg by mouth daily as needed (take with mobib).    IRBESARTAN (AVAPRO) 150 MG TABLET    Take 150 mg by mouth once daily.    MELOXICAM (MOBIC) 15 MG TABLET    Take " 15 mg by mouth once daily.    OM3-DHA-EPA-COD LIVER-VIT A-D3 (COD LIVER OIL) 240-1,000 MG CAP        PANTOPRAZOLE (PROTONIX) 40 MG TABLET    Take 40 mg by mouth 2 (two) times daily.    TRANDOLAPRIL (MAVIK) 4 MG TAB    Take 4 mg by mouth Daily.    VERAPAMIL (CALAN-SR) 240 MG CR TABLET    Take 240 mg by mouth every morning.   Discontinued Medications    MELOXICAM (MOBIC) 7.5 MG TABLET    Take 7.5 mg by mouth once daily.    VERAPAMIL (CALAN) 120 MG TABLET    Take 240 mg by mouth once daily.        Plan:       1.  Hypertension: Stable on medication.  She is on an ACE inhibitor and an ARB along with amlodipine and verapamil.  We will continue this regimen for now, as her blood pressure is well controlled     2. Thyroid nodules: Followed by Dr. Law, Endocrinology.  Check thyroid levels today     3. Morbid obesity: Dietary changes recommended     4.  Chronic lower back pain: Never had surgery.  She takes meloxicam 15 mg daily    5. History of gastric ulcers:  She was taking BC powder and had surgery for gastric ulcer in 2023.  She is on pantoprazole twice a day and famotidine.  We discussed that meloxicam can increase the risk of GERD and gastric ulcers    6. History of hepatitis-C: Treated with interferon in 2004    7.  Mild anxiety and depression: Continue Cymbalta    8.  Wellness:  Schedule mammogram, calcium score, and bone density.  Colonoscopy was within the past 5 years      Check labs today in clinic          Medicare Annual Wellness and Personalized Prevention Plan:   Fall Risk + Home Safety + Hearing Impairment + Depression Screen + Cognitive Impairment Screen + Health Risk Assessment all reviewed    The patient's Health Maintenance was reviewed:  Health Maintenance         Date Due Completion Date    Hepatitis C Screening Never done ---    Lipid Panel Never done ---    TETANUS VACCINE Never done ---    Colorectal Cancer Screening Never done ---    RSV Vaccine (Age 60+ and Pregnant patients) (1 - Risk 60-74  years 1-dose series) Never done ---    Shingles Vaccine (2 of 3) 05/11/2017 3/16/2017    Pneumococcal Vaccines (Age 50+) (2 of 2 - PPSV23) 11/03/2019 11/3/2018    Mammogram 09/29/2024 9/29/2023    COVID-19 Vaccine (4 - 2024-25 season) 04/11/2026 (Originally 9/1/2024) 1/25/2022    DEXA Scan 08/22/2025 8/22/2022             Advance Care Planning   I attest to discussing Advance Care Planning with patient and/or family member.  Education was provided including the importance of the Health Care Power of , Advance Directives, and/or [place] documentation.  The patient expressed understanding to the importance of this information and discussion.  Length of ACP conversation in minutes: 1    Advance Care Planning     Date: 04/11/2025  Patient did not wish or was not able to name a surrogate decision maker or provide an Advance Care Plan.           Opioid Screening: Patient medication list reviewed, patient is not taking prescription opioids. Patient is not using additional opioids than prescribed. Patient is at low risk of substance abuse based on this opioid use history.     A comprehensive HEALTH RISK ASSESSMENT was completed today. Results are summarized below:                  The patient is NOT A TOBACCO USER.  The patient reports NO SIGNIFICANT ALCOHOL USE.     All Questions regarding food, transportation or housing were not answered today.    No follow-ups on file. In addition to their scheduled follow up, the patient has also been instructed to follow up on as needed basis.

## 2025-04-14 ENCOUNTER — TELEPHONE (OUTPATIENT)
Dept: INTERNAL MEDICINE | Facility: CLINIC | Age: 70
End: 2025-04-14
Payer: MEDICARE

## 2025-04-14 NOTE — TELEPHONE ENCOUNTER
I did not see them in my results section, and they are not in there currently.  I reviewed blood work just now.  Thyroid levels are normal.  Hemoglobin A1c is good at 5.3.  Cholesterol is good.    ALP is elevated, but it was elevated back in 2022 as well.  She had an abdominal ultrasound in 4/2024 showing an area of hemangioma, likely causing the elevated ALP.  We will monitor this level for now.

## 2025-04-14 NOTE — TELEPHONE ENCOUNTER
Reviewed results with patient at this time verbalized understanding. Told to call back with any questions/concerns.

## 2025-04-17 DIAGNOSIS — Z00.00 WELLNESS EXAMINATION: Primary | ICD-10-CM

## 2025-04-17 RX ORDER — AMLODIPINE BESYLATE 2.5 MG/1
2.5 TABLET ORAL DAILY
Qty: 90 TABLET | Refills: 3 | Status: SHIPPED | OUTPATIENT
Start: 2025-04-17 | End: 2026-04-17

## 2025-04-17 RX ORDER — MELOXICAM 15 MG/1
15 TABLET ORAL DAILY
Qty: 90 TABLET | Refills: 3 | Status: SHIPPED | OUTPATIENT
Start: 2025-04-17 | End: 2026-04-17

## 2025-04-22 ENCOUNTER — RESULTS FOLLOW-UP (OUTPATIENT)
Dept: INTERNAL MEDICINE | Facility: CLINIC | Age: 70
End: 2025-04-22

## 2025-04-22 DIAGNOSIS — I51.7 CARDIAC ENLARGEMENT: Primary | ICD-10-CM

## 2025-04-30 ENCOUNTER — TELEPHONE (OUTPATIENT)
Dept: RESEARCH | Facility: HOSPITAL | Age: 70
End: 2025-04-30
Payer: MEDICARE

## 2025-04-30 DIAGNOSIS — Z00.00 WELLNESS EXAMINATION: Primary | ICD-10-CM

## 2025-04-30 DIAGNOSIS — I10 PRIMARY HYPERTENSION: ICD-10-CM

## 2025-04-30 RX ORDER — VERAPAMIL HCL 240 MG
240 TABLET, EXTENDED RELEASE ORAL EVERY MORNING
Qty: 90 TABLET | Refills: 3 | Status: SHIPPED | OUTPATIENT
Start: 2025-04-30 | End: 2026-04-30

## 2025-04-30 RX ORDER — DULOXETIN HYDROCHLORIDE 60 MG/1
60 CAPSULE, DELAYED RELEASE ORAL DAILY
Qty: 90 CAPSULE | Refills: 3 | Status: SHIPPED | OUTPATIENT
Start: 2025-04-30 | End: 2026-04-30

## 2025-04-30 RX ORDER — PANTOPRAZOLE SODIUM 40 MG/1
40 TABLET, DELAYED RELEASE ORAL 2 TIMES DAILY
Qty: 180 TABLET | Refills: 3 | Status: SHIPPED | OUTPATIENT
Start: 2025-04-30 | End: 2026-04-30

## 2025-04-30 RX ORDER — DULOXETIN HYDROCHLORIDE 30 MG/1
30 CAPSULE, DELAYED RELEASE ORAL DAILY
Qty: 90 CAPSULE | Refills: 3 | Status: SHIPPED | OUTPATIENT
Start: 2025-04-30 | End: 2026-04-30

## 2025-04-30 RX ORDER — TRANDOLAPRIL 4 MG/1
4 TABLET ORAL DAILY
Qty: 90 TABLET | Refills: 3 | Status: SHIPPED | OUTPATIENT
Start: 2025-04-30 | End: 2026-04-30

## 2025-04-30 NOTE — TELEPHONE ENCOUNTER
Study Title: JOCY: Real-world Evidence to Advance Multi-Cancer Early Detection Health Equity (JOCY/Tank-Medicare study)  Protocol IRB #: 2024.114  IRB Approval Date: 02 July 2024  Sponsor: [employer]  : Sulaiman Dick MD     [facility] ID: [id number]     Results of the [employer] Fu Multi Cancer Early Detection test and were reviewed by PI Dr Dick. Patient was called and notified of test results, there was no signal detected.     Patient reminded, this was a screening test, so we still highly encourage them to continue other normal health screenings  and to continue to adhere to guideline-recommended cancer screenings.     Patient was asked about completing follow-up questionnaire online and was agreeable.

## 2025-05-06 ENCOUNTER — DOCUMENTATION ONLY (OUTPATIENT)
Dept: INTERNAL MEDICINE | Facility: CLINIC | Age: 70
End: 2025-05-06
Payer: MEDICARE

## 2025-05-06 ENCOUNTER — RESULTS FOLLOW-UP (OUTPATIENT)
Dept: INTERNAL MEDICINE | Facility: CLINIC | Age: 70
End: 2025-05-06
Payer: MEDICARE

## 2025-05-06 DIAGNOSIS — M81.0 OSTEOPOROSIS, UNSPECIFIED OSTEOPOROSIS TYPE, UNSPECIFIED PATHOLOGICAL FRACTURE PRESENCE: Primary | ICD-10-CM

## 2025-05-06 DIAGNOSIS — Z78.0 POSTMENOPAUSAL: ICD-10-CM

## 2025-05-06 LAB
BCS RECOMMENDATION EXT: NORMAL
BMD RECOMMENDATION EXT: NORMAL

## 2025-05-06 RX ORDER — ACETAMINOPHEN 500 MG
5000 TABLET ORAL EVERY OTHER DAY
COMMUNITY

## 2025-05-06 RX ORDER — VIT C/E/ZN/COPPR/LUTEIN/ZEAXAN 250MG-90MG
CAPSULE ORAL EVERY OTHER DAY
COMMUNITY

## 2025-05-06 RX ORDER — ALENDRONATE SODIUM 70 MG/1
70 TABLET ORAL
Qty: 4 TABLET | Refills: 11 | Status: SHIPPED | OUTPATIENT
Start: 2025-05-06 | End: 2026-05-06

## 2025-05-13 ENCOUNTER — DOCUMENTATION ONLY (OUTPATIENT)
Dept: INTERNAL MEDICINE | Facility: CLINIC | Age: 70
End: 2025-05-13
Payer: MEDICARE

## 2025-05-23 ENCOUNTER — TELEPHONE (OUTPATIENT)
Dept: INTERNAL MEDICINE | Facility: CLINIC | Age: 70
End: 2025-05-23
Payer: MEDICARE

## 2025-05-23 DIAGNOSIS — Z00.00 WELLNESS EXAMINATION: ICD-10-CM

## 2025-05-23 RX ORDER — MELOXICAM 15 MG/1
15 TABLET ORAL DAILY
Qty: 90 TABLET | Refills: 3 | Status: SHIPPED | OUTPATIENT
Start: 2025-05-23 | End: 2026-05-23

## 2025-05-23 RX ORDER — SULFAMETHOXAZOLE AND TRIMETHOPRIM 800; 160 MG/1; MG/1
1 TABLET ORAL 2 TIMES DAILY
Qty: 20 TABLET | Refills: 0 | Status: SHIPPED | OUTPATIENT
Start: 2025-05-23 | End: 2025-06-02

## 2025-05-23 RX ORDER — IRBESARTAN 150 MG/1
150 TABLET ORAL DAILY
Qty: 90 TABLET | Refills: 3 | Status: SHIPPED | OUTPATIENT
Start: 2025-05-23 | End: 2026-05-23

## 2025-05-23 NOTE — TELEPHONE ENCOUNTER
Patient called to report Boil on breast and axilla area. Dr. Gomez ordered bactrim ds bid x 10 days.

## 2025-06-06 DIAGNOSIS — Z00.00 WELLNESS EXAMINATION: Primary | ICD-10-CM

## 2025-06-06 DIAGNOSIS — M81.0 OSTEOPOROSIS, UNSPECIFIED OSTEOPOROSIS TYPE, UNSPECIFIED PATHOLOGICAL FRACTURE PRESENCE: ICD-10-CM

## 2025-06-06 RX ORDER — FAMOTIDINE 20 MG/1
20 TABLET, FILM COATED ORAL DAILY PRN
Qty: 90 TABLET | Refills: 3 | Status: SHIPPED | OUTPATIENT
Start: 2025-06-06 | End: 2026-06-06

## 2025-06-06 RX ORDER — ALENDRONATE SODIUM 70 MG/1
70 TABLET ORAL
Qty: 12 TABLET | Refills: 3 | Status: SHIPPED | OUTPATIENT
Start: 2025-06-06 | End: 2026-06-06

## (undated) DEVICE — KIT AIRSEAL BIFURCT FLTR TB

## (undated) DEVICE — OBTURATOR BLADELESS 8MM XI CLR

## (undated) DEVICE — SEAL UNIVERSAL 5MM-8MM XI

## (undated) DEVICE — DRAPE COLUMN DAVINCI XI

## (undated) DEVICE — NDL HYPO REG 25G X 1 1/2

## (undated) DEVICE — KIT SURGICAL TURNOVER

## (undated) DEVICE — ELECTRODE REM PLYHSV RETURN 9

## (undated) DEVICE — GLOVE PROTEXIS BLUE LATEX 7

## (undated) DEVICE — SUT VLOC 90 3-0 V-20 NDL 6

## (undated) DEVICE — DRAPE ARM DAVINCI XI

## (undated) DEVICE — BANDAGE CURITY SHEER ADH 1X3IN

## (undated) DEVICE — TROCAR KII FIOS ZTHREAD 11X100

## (undated) DEVICE — COVER TIP CURVED SCISSORS XI

## (undated) DEVICE — SUT MCRYL PLUS 4-0 PS2 27IN

## (undated) DEVICE — SUT VLOC 2-0 6IN 1/2 CIRCLE TP

## (undated) DEVICE — NDL 20GX1-1/2IN IB

## (undated) DEVICE — TRAY CATH FOL SIL URIMTR 16FR

## (undated) DEVICE — HOLDER STRIP-T SELF ADH 2X10IN

## (undated) DEVICE — GOWN X-LG STERILE BACK

## (undated) DEVICE — KIT GEN LAPAROSCOPY LAFAYETTE

## (undated) DEVICE — SYR ONLY LUER LOCK 20CC

## (undated) DEVICE — SOL CLEARIFY VISUALIZATION LAP

## (undated) DEVICE — KIT AIRSEAL CANN CAP STD 8MM

## (undated) DEVICE — CONTAINER SPECIMEN SCREW 4OZ

## (undated) DEVICE — TAPE SILK 3IN

## (undated) DEVICE — COVER MAYO STAND REINFRCD 30

## (undated) DEVICE — GLOVE PROTEXIS HYDROGEL SZ7

## (undated) DEVICE — DEVICE CLSR V-LOC 0 GS-21 6IN

## (undated) DEVICE — ADHESIVE DERMABOND ADVANCED

## (undated) DEVICE — GLOVE PROTEXIS HYDROGEL SZ6.5

## (undated) DEVICE — CHLORAPREP W TINT 26ML APPL